# Patient Record
Sex: FEMALE | Race: WHITE | NOT HISPANIC OR LATINO | Employment: FULL TIME | URBAN - METROPOLITAN AREA
[De-identification: names, ages, dates, MRNs, and addresses within clinical notes are randomized per-mention and may not be internally consistent; named-entity substitution may affect disease eponyms.]

---

## 2022-12-21 PROBLEM — E66.813 CLASS 3 SEVERE OBESITY WITHOUT SERIOUS COMORBIDITY WITH BODY MASS INDEX (BMI) OF 45.0 TO 49.9 IN ADULT (HCC): Status: ACTIVE | Noted: 2022-12-21

## 2023-10-10 ENCOUNTER — OFFICE VISIT (OUTPATIENT)
Dept: POSTPARTUM | Facility: CLINIC | Age: 28
End: 2023-10-10
Payer: COMMERCIAL

## 2023-10-10 DIAGNOSIS — E28.2 PCOS (POLYCYSTIC OVARIAN SYNDROME): ICD-10-CM

## 2023-10-10 DIAGNOSIS — O92.79 INSUFFICIENT LACTATION: Primary | ICD-10-CM

## 2023-10-10 PROCEDURE — 99215 OFFICE O/P EST HI 40 MIN: CPT | Performed by: PEDIATRICS

## 2023-10-10 RX ORDER — METFORMIN HYDROCHLORIDE 500 MG/1
1000 TABLET, EXTENDED RELEASE ORAL
Qty: 60 TABLET | Refills: 0 | Status: SHIPPED | OUTPATIENT
Start: 2023-10-10 | End: 2023-11-02 | Stop reason: ALTCHOICE

## 2023-10-10 NOTE — PATIENT INSTRUCTIONS
Start Metformin 500 mg ER, 2 at dinner. It may take a week, but there should be about an ounce daily increase in milk production. Let me know how things are going.

## 2023-10-10 NOTE — PROGRESS NOTES
BREAST FEEDING FOLLOW UP VISIT    Informant/Relationship: Marisa/mom    Discussion of General Lactation Issues: Yu Martinez is still storing all the milk she is pumping because she was eating something that had whey in it without realizing it. Darreld Holiday has been doing well on the Alimentum. Marisa's production has decreased further with pumping. She pumps about 7 x/day and typically gets drops except for the first pumping in the morning and again in the evening. This morning Yu Martinez had pain while pumping the right breast. Her pain was in the areola. The pain was right around the nipple. She saw no differences in how the pump fit. The hand pump was no more comfortable. Infant is 48days old today. Interval Breastfeeding History:    Frequency of breast feeding: none  Does mother feel breastfeeding is effective: n/a  Does infant appear satisfied after nursing:n/a  Stooling pattern normal:Yes  Urinating frequently:Yes  Using shield or shells:No    Alternative/Artificial Feedings:   Bottle: Yes, using paced bottle feeding  Cup: No  Syringe/Finger: No           Formula Type: Alimentum                     Amount: 3 oz            Breast Milk:                      Amount: n/a            Frequency Q 3-4 Hr between feedings  Elimination Problems: No      Equipment:  Nipple Shield             Type: n/a             Size: n/a             Frequency of Use: n/a  Pump            Type: Mom Cozy, Spectra, hand pump            Frequency of Use: 7 x/day  Shells            Type: n/a            Frequency of use: n/a    Equipment Problems: yes had pain pumping today      Mom:  Breast: Large, pendulous, rounded shape, softer  Nipple Assessment in General: Normal: elongated/eraser, no discoloration and no damage noted. 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 She is well-developed. She is obese. HENT:      Head: Normocephalic and atraumatic. Eyes:      Extraocular Movements: Extraocular movements intact. Neck:      Thyroid: No thyromegaly. Cardiovascular:      Rate and Rhythm: Normal rate and regular rhythm. Pulses: Normal pulses. Heart sounds: Normal heart sounds. No murmur heard. Pulmonary:      Effort: Pulmonary effort is normal.      Breath sounds: Normal breath sounds. Musculoskeletal:      Cervical back: Normal range of motion and neck supple. Lymphadenopathy:      Cervical: No cervical adenopathy. Upper Body:      Right upper body: No pectoral adenopathy. Left upper body: No pectoral adenopathy. Neurological:      General: No focal deficit present. Mental Status: She is alert and oriented to person, place, and time. Psychiatric:         Mood and Affect: Mood normal.         Behavior: Behavior normal.         Thought Content: Thought content normal.         Judgment: Judgment normal.   Vitals and nursing note reviewed.        Infant:  Behaviors: Alert  Color: Healthy  Birth weight: 3.36 kg  Current weight: 4.095 kg    Problems with infant: Milk protein allergy?, slow weight gain      General Appearance:  Alert, active, no distress                            Head:  Normocephalic, AFOF, sutures opposed                            Eyes:   Conjunctiva clear, no drainage                            Ears:   Normally placed, no anomolies                           Nose:   Septum intact, no drainage or erythema                          Mouth:  No lesions                   Neck:  Supple, symmetrical, trachea midline, no adenopathy; thyroid: no enlargement, symmetric, no tenderness/mass/nodules                Respiratory:  No grunting, flaring, retractions, breath sounds clear and equal           Cardiovascular:  Regular rate and rhythm. No murmur. Adequate perfusion/capillary refill. Femoral pulse present                  Abdomen:    Soft, non-tender, no masses, bowel sounds present, no HSM            Genitourinary:  Normal female genitalia, anus patent                         Spine:   No abnormalities noted       Musculoskeletal:   Full range of motion         Skin/Hair/Nails:   Skin warm, dry, and intact, no rashes or abnormal dyspigmentation or lesions               Neurologic:   No abnormal movement, tone appropriate for gestational age     Latch:  Efficiency:               Lips Flanged: Yes, on bottle              Depth of latch: Good, on bottle              Audible Swallow: Yes, on bottle              Visible Milk: Yes, on bottle              Wide Open/ Asymmetrical: Yes, on bottle              Suck Swallow Cycle: Breathing: Unlabored, Coordinated: Yes  Nipple Assessment after latch: n/a; baby is bottle fed  Latch Problems: None on the bottle; baby is not offered the breast due to concerns regarding recent maternal whey ingestion.     Position:  Infant's Ergonomics/Body               Body Alignment: Yes, for bottle feeding               Head Supported: Yes, for bottle feeding               Close to Mom's body/ Lifted/ Supported: Yes, for bottle feeding               Mom's Ergonomics/Body: Yes, for bottle feeding                           Supported: Yes, for bottle feeding                           Sitting Back: Yes, for bottle feeding                           Brings Baby to her breast: n/a; baby is fed via bottle  Positioning Problems: none for bottle feeding      Education:  Reviewed Alternative/Artificial Feedings: Paced bottle feeding, add a little more formula to each bottle than Dianna routinely finishes  Reviewed Mom/Breast care: Discussed the use of metformin to build breast tissue and milk production        Plan:  Discussed history and physical exams with Riri Espinal. Discussed the current understanding of insulin resistance and breast milk production. There are multiple conditions with insulin resistance that may have a negative impact on milk production. PCOS is one of these conditions. Recommended starting metformin er 500 mg by mouth twice daily, or 2 pills once daily. This may be associated with as much as an ounce per week increase in breast milk production. Recommended updating as needed. Additional lactation support as needed.

## 2023-10-12 ENCOUNTER — POSTPARTUM VISIT (OUTPATIENT)
Dept: OBGYN CLINIC | Facility: CLINIC | Age: 28
End: 2023-10-12

## 2023-10-12 VITALS
BODY MASS INDEX: 46.93 KG/M2 | SYSTOLIC BLOOD PRESSURE: 116 MMHG | HEIGHT: 61 IN | WEIGHT: 248.6 LBS | DIASTOLIC BLOOD PRESSURE: 68 MMHG

## 2023-10-12 PROCEDURE — 99024 POSTOP FOLLOW-UP VISIT: CPT | Performed by: OBSTETRICS & GYNECOLOGY

## 2023-10-20 ENCOUNTER — TELEPHONE (OUTPATIENT)
Dept: PULMONOLOGY | Facility: CLINIC | Age: 28
End: 2023-10-20

## 2023-10-20 NOTE — TELEPHONE ENCOUNTER
Called patient she is not using cpap consistently and especially since she had a baby 2 months ago. There was a lot of water in tube that would end up dripping down her face at the time she was using it. I encouraged her to take her machine to [facility] in [place] to adjust the humidification setting or try to not put water in the chamber to see if she is comfortable with that, and to also try using it anytime during the day when she is at rest laying down or sitting in a chair with her baby. Patient is welcomed to contact me back for any further concerns or issues. She had her 31-90 day follow-up appointment so wants to cancel this appointment with Dr. Victorino Campoverde until she gets better situated.

## 2023-11-02 ENCOUNTER — OFFICE VISIT (OUTPATIENT)
Dept: FAMILY MEDICINE CLINIC | Facility: HOSPITAL | Age: 28
End: 2023-11-02
Payer: COMMERCIAL

## 2023-11-02 VITALS
HEART RATE: 60 BPM | DIASTOLIC BLOOD PRESSURE: 64 MMHG | TEMPERATURE: 97.8 F | HEIGHT: 61 IN | SYSTOLIC BLOOD PRESSURE: 118 MMHG | BODY MASS INDEX: 47.46 KG/M2 | WEIGHT: 251.4 LBS

## 2023-11-02 DIAGNOSIS — F41.8 POSTPARTUM ANXIETY: ICD-10-CM

## 2023-11-02 DIAGNOSIS — Z00.00 ANNUAL PHYSICAL EXAM: Primary | ICD-10-CM

## 2023-11-02 DIAGNOSIS — Z91.018 MULTIPLE FOOD ALLERGIES: ICD-10-CM

## 2023-11-02 DIAGNOSIS — L50.9 URTICARIA: ICD-10-CM

## 2023-11-02 DIAGNOSIS — K82.4 GALLBLADDER POLYP: ICD-10-CM

## 2023-11-02 PROCEDURE — 99395 PREV VISIT EST AGE 18-39: CPT | Performed by: NURSE PRACTITIONER

## 2023-11-02 NOTE — PATIENT INSTRUCTIONS
Wellness Visit for Adults   AMBULATORY CARE:   A wellness visit  is when you see your healthcare provider to get screened for health problems. Your healthcare provider will also give you advice on how to stay healthy. Write down your questions so you remember to ask them. Ask your healthcare provider how often you should have a wellness visit. What happens at a wellness visit:  Your healthcare provider will ask about your health, and your family history of health problems. This includes high blood pressure, heart disease, and cancer. He or she will ask if you have symptoms that concern you, if you smoke, and about your mood. You may also be asked about your intake of medicines, supplements, food, and alcohol. Any of the following may be done: Your weight  will be checked. Your height may also be checked so your body mass index (BMI) can be calculated. Your BMI shows if you are at a healthy weight. Your blood pressure  and heart rate will be checked. Your temperature may also be checked. Blood and urine tests  may be done. Blood tests may be done to check your cholesterol levels. Abnormal cholesterol levels increase your risk for heart disease and stroke. You may also need a blood or urine test to check for diabetes if you are at increased risk. Urine tests may be done to look for signs of an infection or kidney disease. A physical exam  includes checking your heartbeat and lungs with a stethoscope. Your healthcare provider may also check your skin to look for sun damage. Screening tests  may be recommended. A screening test is done to check for diseases that may not cause symptoms. The screening tests you may need depend on your age, gender, family history, and lifestyle habits. For example, colorectal screening may be recommended if you are 48years old or older. Screening tests you need if you are a woman:   A Pap smear  is used to screen for cervical cancer.  Pap smears are usually done every 3 to 5 years depending on your age. You may need them more often if you have had abnormal Pap smear test results in the past. Ask your healthcare provider how often you should have a Pap smear. A mammogram  is an x-ray of your breasts to screen for breast cancer. Experts recommend mammograms every 2 years starting at age 48 years. You may need a mammogram at age 52 years or younger if you have an increased risk for breast cancer. Talk to your healthcare provider about when you should start having mammograms and how often you need them. Vaccines you may need:   Get an influenza vaccine  every year. The influenza vaccine protects you from the flu. Several types of viruses cause the flu. The viruses change over time, so new vaccines are made each year. Get a tetanus-diphtheria (Td) booster vaccine  every 10 years. This vaccine protects you against tetanus and diphtheria. Tetanus is a severe infection that may cause painful muscle spasms and lockjaw. Diphtheria is a severe bacterial infection that causes a thick covering in the back of your mouth and throat. Get a human papillomavirus (HPV) vaccine  if you are female and aged 23 to 32 or male 23 to 24 and never received it. This vaccine protects you from HPV infection. HPV is the most common infection spread by sexual contact. HPV may also cause vaginal, penile, and anal cancers. Get a pneumococcal vaccine  if you are aged 72 years or older. The pneumococcal vaccine is an injection given to protect you from pneumococcal disease. Pneumococcal disease is an infection caused by pneumococcal bacteria. The infection may cause pneumonia, meningitis, or an ear infection. Get a shingles vaccine  if you are 60 or older, even if you have had shingles before. The shingles vaccine is an injection to protect you from the varicella-zoster virus. This is the same virus that causes chickenpox.  Shingles is a painful rash that develops in people who had chickenpox or have been exposed to the virus. How to eat healthy:  My Plate is a model for planning healthy meals. It shows the types and amounts of foods that should go on your plate. Fruits and vegetables make up about half of your plate, and grains and protein make up the other half. A serving of dairy is included on the side of your plate. The amount of calories and serving sizes you need depends on your age, gender, weight, and height. Examples of healthy foods are listed below:  Eat a variety of vegetables  such as dark green, red, and orange vegetables. You can also include canned vegetables low in sodium (salt) and frozen vegetables without added butter or sauces. Eat a variety of fresh fruits , canned fruit in 100% juice, frozen fruit, and dried fruit. Include whole grains. At least half of the grains you eat should be whole grains. Examples include whole-wheat bread, wheat pasta, brown rice, and whole-grain cereals such as oatmeal.    Eat a variety of protein foods such as seafood (fish and shellfish), lean meat, and poultry without skin (turkey and chicken). Examples of lean meats include pork leg, shoulder, or tenderloin, and beef round, sirloin, tenderloin, and extra lean ground beef. Other protein foods include eggs and egg substitutes, beans, peas, soy products, nuts, and seeds. Choose low-fat dairy products such as skim or 1% milk or low-fat yogurt, cheese, and cottage cheese. Limit unhealthy fats  such as butter, hard margarine, and shortening. Exercise:  Exercise at least 30 minutes per day on most days of the week. Some examples of exercise include walking, biking, dancing, and swimming. You can also fit in more physical activity by taking the stairs instead of the elevator or parking farther away from stores. Include muscle strengthening activities 2 days each week. Regular exercise provides many health benefits.  It helps you manage your weight, and decreases your risk for type 2 diabetes, heart disease, stroke, and high blood pressure. Exercise can also help improve your mood. Ask your healthcare provider about the best exercise plan for you. General health and safety guidelines:   Do not smoke. Nicotine and other chemicals in cigarettes and cigars can cause lung damage. Ask your healthcare provider for information if you currently smoke and need help to quit. E-cigarettes or smokeless tobacco still contain nicotine. Talk to your healthcare provider before you use these products. Limit alcohol. A drink of alcohol is 12 ounces of beer, 5 ounces of wine, or 1½ ounces of liquor. Lose weight, if needed. Being overweight increases your risk of certain health conditions. These include heart disease, high blood pressure, type 2 diabetes, and certain types of cancer. Protect your skin. Do not sunbathe or use tanning beds. Use sunscreen with a SPF 15 or higher. Apply sunscreen at least 15 minutes before you go outside. Reapply sunscreen every 2 hours. Wear protective clothing, hats, and sunglasses when you are outside. Drive safely. Always wear your seatbelt. Make sure everyone in your car wears a seatbelt. A seatbelt can save your life if you are in an accident. Do not use your cell phone when you are driving. This could distract you and cause an accident. Pull over if you need to make a call or send a text message. Practice safe sex. Use latex condoms if are sexually active and have more than one partner. Your healthcare provider may recommend screening tests for sexually transmitted infections (STIs). Wear helmets, lifejackets, and protective gear. Always wear a helmet when you ride a bike or motorcycle, go skiing, or play sports that could cause a head injury. Wear protective equipment when you play sports. Wear a lifejacket when you are on a boat or doing water sports.     © Copyright [employer] 2023 Information is for End User's use only and may not be sold, redistributed or otherwise used for commercial purposes. The above information is an  only. It is not intended as medical advice for individual conditions or treatments. Talk to your doctor, nurse or pharmacist before following any medical regimen to see if it is safe and effective for you. Obesity   AMBULATORY CARE:   Obesity  means your body mass index (BMI) is greater than 30. Your healthcare provider will use your age, height, and weight to measure your BMI. The risks of obesity include  many health problems, including injuries or physical disability. Diabetes (high blood sugar level)    High blood pressure or high cholesterol    Heart disease or heart failure    Stroke    Gallbladder or liver disease    Cancer of the colon, breast, prostate, liver, or kidney    Sleep apnea    Arthritis or gout    Screening  is done to check for health conditions before you have signs or symptoms. If you are 28to 79years old, your blood sugar level may be checked every 3 years for signs of prediabetes or diabetes. Your healthcare provider will check your blood pressure at each visit. High blood pressure can lead to a stroke or other problems. Your provider may check for signs of heart disease, cancer, or other health problems. Seek care immediately if:   You have a severe headache, confusion, or difficulty speaking. You have weakness on one side of your body. You have chest pain, sweating, or shortness of breath. Call your doctor if:   You have symptoms of gallbladder or liver disease, such as pain in your upper abdomen. You have knee or hip pain and discomfort while walking. You have symptoms of diabetes, such as intense hunger and thirst, and frequent urination. You have symptoms of sleep apnea, such as snoring or daytime sleepiness. You have questions or concerns about your condition or care. Treatment for obesity  focuses on helping you lose weight to improve your health.  Even a small decrease in BMI can reduce the risk for many health problems. Your healthcare provider will help you set a weight-loss goal.  Lifestyle changes  are the first step in treating obesity. These include making healthy food choices and getting regular physical activity. Your healthcare provider may suggest a weight-loss program that involves coaching, education, and therapy. Medicine  may help you lose weight when it is used with a healthy foods and physical activity. Surgery  can help you lose weight if you have obesity along with other health problems. Several types of weight-loss surgery are available. Ask your healthcare provider for more information. Tips for safe weight loss:   Set small, realistic goals. An example of a small goal is to walk for 20 minutes 5 days a week. Anther goal is to lose 5% of your body weight. Ask for support. Tell friends, family members, and coworkers about your goals. Ask someone to lose weight with you. You may also want to join a weight-loss support group. Identify foods or triggers that may cause you to overeat. Remove tempting high-calorie foods from your home and workplace. Place a bowl of fresh fruit on your kitchen counter. If stress causes you to eat, find other ways to cope with stress. A counselor or therapist may be able to help you. Track your daily calories and activity. Write down what you eat and drink. Also write down how many minutes of physical activity you do each day. Track your weekly weight. Weigh yourself in the morning, before you eat or drink anything but after you use the bathroom. Use the same scale, in the same place, and in similar clothing each time. Only weigh yourself 1 to 2 times each week, or as directed. You may become discouraged if you weigh yourself every day. Eating changes: You will need to eat 500 to 1,000 fewer calories each day than you currently eat to lose 1 to 2 pounds a week.  The following changes will help you cut calories:  Eat smaller portions. Use small plates, no larger than 9 inches in diameter. Fill your plate half full of fruits and vegetables. Measure your food using measuring cups until you know what a serving size looks like. Eat 3 meals and 1 or 2 snacks each day. Plan your meals in advance. [place] and eat at home most of the time. Eat slowly. Do not skip meals. Skipping meals can lead to overeating later in the day. This can make it harder for you to lose weight. Talk with a dietitian to help you make a meal plan and schedule that is right for you. Eat fruits and vegetables at every meal.  They are low in calories and high in fiber, which makes you feel full. Do not add butter, margarine, or cream sauce to vegetables. Use herbs to season steamed vegetables. Eat less fat and fewer fried foods. Eat more baked or grilled chicken and fish. These protein sources are lower in calories and fat than red meat. Limit fast food. Dress your salads with olive oil and vinegar instead of bottled dressing. Limit the amount of sugar you eat. Do not drink sugary beverages. Limit alcohol. Activity changes:  Physical activity is good for your body in many ways. It helps you burn calories and build strong muscles. It decreases stress and depression, and improves your mood. It can also help you sleep better. Talk to your healthcare provider before you begin an exercise program.  Exercise for at least 30 minutes 5 days a week. Start slowly. Set aside time each day for physical activity that you enjoy and that is convenient for you. It is best to do both weight training and an activity that increases your heart rate, such as walking, bicycling, or swimming. Find ways to be more active. Do yard work and housecleaning. Walk up the stairs instead of using elevators. Spend your leisure time going to events that require walking, such as outdoor festivals or fairs.  This extra physical activity can help you lose weight and keep it off. Follow up with your doctor as directed: You may need to meet with a dietitian. Write down your questions so you remember to ask them during your visits. © Copyright [employer] 2023 Information is for End User's use only and may not be sold, redistributed or otherwise used for commercial purposes. The above information is an  only. It is not intended as medical advice for individual conditions or treatments. Talk to your doctor, nurse or pharmacist before following any medical regimen to see if it is safe and effective for you.

## 2023-11-02 NOTE — ASSESSMENT & PLAN NOTE
Pt aware that consensus guidelines recommend no repeat imaging for polyps of this size. She is concerned due to [facility] recommending she have this rechecked. I agreed to order it but she will need to check with insurance regarding coverage.

## 2023-11-09 ENCOUNTER — SOCIAL WORK (OUTPATIENT)
Dept: BEHAVIORAL/MENTAL HEALTH CLINIC | Facility: CLINIC | Age: 28
End: 2023-11-09
Payer: COMMERCIAL

## 2023-11-09 DIAGNOSIS — F41.8 POSTPARTUM ANXIETY: Primary | ICD-10-CM

## 2023-11-09 PROCEDURE — 90791 PSYCH DIAGNOSTIC EVALUATION: CPT | Performed by: SOCIAL WORKER
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
## 2023-11-17 NOTE — PROGRESS NOTES
[address] [place] [address].[address], [address] [place] [address], [address]    Assessment/Plan:  Dante Duckworth is a 29y.o. year old  who presents for postpartum visit. Routine Postpartum Care  Normal postpartum exam  Contraception: condoms  Depression Screen: low risk  Feeding: breast - pumping  Psychosocial support: good  Patient Education: "Fourth Trimester Project: [email or web address]"  Cervical cancer screening Up to Date, next due   Follow up in: 3 months or as needed. Additional Problems:  1. Routine postpartum follow-up          Subjective:     CC: Postpartum visit    Adriana Martinez is a 29 y.o. y.o. female  who presents for a postpartum visit. She is 8 weeks postpartum following a spontaneous vaginal delivery on 23 at 39.2 weeks. Outcome: spontaneous vaginal delivery. Anesthesia: epidural. Postpartum course has been unremarkable. Baby's course has been unremarkable. Baby is feeding by breast.     Bleeding no bleeding. Bowel function is normal. Bladder function is normal. Patient is not sexually active. Contraception method is condoms. Postpartum depression screening: negative.     The following portions of the patient's history were reviewed and updated as appropriate: allergies, current medications, past family history, past medical history, obstetric history, gynecologic history, past social history, past surgical history and problem list.      Objective:  /68 (BP Location: Left arm, Patient Position: Sitting, Cuff Size: Large)   Ht 5' 1" (1.549 m)   Wt 113 kg (248 lb 9.6 oz)   LMP 10/07/2023 (Exact Date)   Breastfeeding Yes Comment: pumping  BMI 46.97 kg/m²   Pregravid Weight/BMI: 115 kg (253 lb) (BMI 47.83)  Current Weight: 113 kg (248 lb 9.6 oz)   Total Weight Gain: 13.9 kg (30 lb 9.6 oz)   Pre- Vitals      Flowsheet Row Most Recent Value   Prenatal Assessment    Prenatal Vitals    Blood Pressure 116/68   Weight - Scale 113 kg (248 lb 9.6 oz)   Urine Albumin/Glucose    Dilation/Effacement/Station    Vaginal Drainage    Edema              General: Well appearing, no distress. Mood and affect: Appropriate. Abdomen: Soft, nontender  Thyroid: No masses  Incision: n/a  Vulva: Well healed  Vagina: Well healed. No lesions  Urethra: Normal  Cervix: Healed, no lesions  Uterus: Normal size, no masses  Adnexa: No pain or masses  Extremities: Warm and well perfused. Non tender.

## 2023-12-05 ENCOUNTER — TELEPHONE (OUTPATIENT)
Dept: FAMILY MEDICINE CLINIC | Facility: HOSPITAL | Age: 28
End: 2023-12-05

## 2023-12-05 NOTE — TELEPHONE ENCOUNTER
Patient's  called and said she wanted to cancel all of her upcoming appointments with you. When I asked why she wanted to cancel them he said she doesn't want to leave her baby. I offered to maybe do virtual appts and he said no, please cancel them. He is not listed as someone we can speak to on her behalf. I have left all appts scheduled as is. You may want to reach out to her for confirmation.

## 2023-12-11 ENCOUNTER — TELEPHONE (OUTPATIENT)
Dept: FAMILY MEDICINE CLINIC | Facility: HOSPITAL | Age: 28
End: 2023-12-11

## 2023-12-11 NOTE — TELEPHONE ENCOUNTER
Please clarify A or B is needed:        Hi, this is Elfredia Blizzard and date of birth is [birth year]. I was calling to see if I could schedule an appointment to get the Hepatitis A shot is a requirement for my job. My phone number is [phone]. Thank you. You received a voice mail from [facility].

## 2023-12-20 ENCOUNTER — TELEPHONE (OUTPATIENT)
Dept: OBGYN CLINIC | Facility: CLINIC | Age: 28
End: 2023-12-20

## 2023-12-20 NOTE — TELEPHONE ENCOUNTER
"Pt left a message she is experiencing some \"soreness\" when she walks around, discomfort is similar to what she had after having a baby.  Attempted to return call to patient, VM received, left a message for patient to call back to further address symptoms.   "

## 2023-12-21 NOTE — TELEPHONE ENCOUNTER
Call transferred to front office to schedule problem visit. Offered numerous times to be seen, patient declined needed after 5 pm. Patient states will call back next week for same day office visit availabilities.

## 2023-12-21 NOTE — TELEPHONE ENCOUNTER
"Marisa is having vaginal irritation with sitting or walking, noticed a \"Metalic\" odor, watery discharge. Denies any itching. Reminded Marisa to wear loose clothing, decrease sugar intake & not to take any baths. Transferred pt to  to schedule an appt..  "

## 2023-12-28 ENCOUNTER — CLINICAL SUPPORT (OUTPATIENT)
Dept: FAMILY MEDICINE CLINIC | Facility: HOSPITAL | Age: 28
End: 2023-12-28
Payer: COMMERCIAL

## 2023-12-28 DIAGNOSIS — Z23 ENCOUNTER FOR IMMUNIZATION: Primary | ICD-10-CM

## 2023-12-28 PROCEDURE — 90471 IMMUNIZATION ADMIN: CPT

## 2023-12-28 PROCEDURE — 90632 HEPA VACCINE ADULT IM: CPT

## 2024-01-17 ENCOUNTER — TELEPHONE (OUTPATIENT)
Dept: PULMONOLOGY | Facility: CLINIC | Age: 29
End: 2024-01-17

## 2024-01-18 ENCOUNTER — TELEPHONE (OUTPATIENT)
Dept: FAMILY MEDICINE CLINIC | Facility: HOSPITAL | Age: 29
End: 2024-01-18

## 2024-01-18 NOTE — TELEPHONE ENCOUNTER
Works in  and a lot of kids have the flu.  Has a child at home.  Doesn't want to get sick or take home to the baby.  Are you able to get her a script for Tamiflu ?  Please call if/when ready.

## 2024-01-18 NOTE — TELEPHONE ENCOUNTER
I informed patient that we do not prescribe med if not indicated. Call back for ov if sx's develop. CR

## 2024-01-18 NOTE — TELEPHONE ENCOUNTER
Called PT to schedule FU appt with compliance, pt refused at this time and would like to call back when things settle down with her daughters appointments.     PT having issues with her CPAP hose leaking, would like to know if an order can be placed for new supplies.

## 2024-01-22 DIAGNOSIS — G47.33 OSA (OBSTRUCTIVE SLEEP APNEA): Primary | ICD-10-CM

## 2024-01-24 LAB

## 2024-02-01 ENCOUNTER — ANNUAL EXAM (OUTPATIENT)
Dept: OBGYN CLINIC | Facility: CLINIC | Age: 29
End: 2024-02-01
Payer: COMMERCIAL

## 2024-02-01 VITALS
BODY MASS INDEX: 49.28 KG/M2 | WEIGHT: 261 LBS | DIASTOLIC BLOOD PRESSURE: 78 MMHG | HEIGHT: 61 IN | SYSTOLIC BLOOD PRESSURE: 122 MMHG

## 2024-02-01 DIAGNOSIS — Z30.011 OCP (ORAL CONTRACEPTIVE PILLS) INITIATION: ICD-10-CM

## 2024-02-01 DIAGNOSIS — Z01.419 ROUTINE GYNECOLOGICAL EXAMINATION: Primary | ICD-10-CM

## 2024-02-01 PROBLEM — O99.013 MATERNAL IRON DEFICIENCY ANEMIA AFFECTING PREGNANCY IN THIRD TRIMESTER, ANTEPARTUM: Status: RESOLVED | Noted: 2023-06-05 | Resolved: 2024-02-01

## 2024-02-01 PROBLEM — Z3A.39 39 WEEKS GESTATION OF PREGNANCY: Status: RESOLVED | Noted: 2023-08-16 | Resolved: 2024-02-01

## 2024-02-01 PROBLEM — D50.9 MATERNAL IRON DEFICIENCY ANEMIA AFFECTING PREGNANCY IN THIRD TRIMESTER, ANTEPARTUM: Status: RESOLVED | Noted: 2023-06-05 | Resolved: 2024-02-01

## 2024-02-01 PROBLEM — O99.713 PRURITUS OF PREGNANCY IN THIRD TRIMESTER: Status: RESOLVED | Noted: 2023-07-10 | Resolved: 2024-02-01

## 2024-02-01 PROBLEM — O36.8130 DECREASED FETAL MOVEMENT AFFECTING MANAGEMENT OF PREGNANCY IN THIRD TRIMESTER: Status: RESOLVED | Noted: 2023-05-08 | Resolved: 2024-02-01

## 2024-02-01 PROBLEM — L29.9 PRURITUS OF PREGNANCY IN THIRD TRIMESTER: Status: RESOLVED | Noted: 2023-07-10 | Resolved: 2024-02-01

## 2024-02-01 PROBLEM — Z3A.37 37 WEEKS GESTATION OF PREGNANCY: Status: RESOLVED | Noted: 2023-07-14 | Resolved: 2024-02-01

## 2024-02-01 PROCEDURE — S0612 ANNUAL GYNECOLOGICAL EXAMINA: HCPCS | Performed by: OBSTETRICS & GYNECOLOGY

## 2024-02-01 RX ORDER — NORGESTIMATE AND ETHINYL ESTRADIOL 0.25-0.035
1 KIT ORAL DAILY
Qty: 84 TABLET | Refills: 4 | Status: SHIPPED | OUTPATIENT
Start: 2024-02-01

## 2024-02-01 NOTE — PROGRESS NOTES
[facility] [place]  [address] [address]    ASSESSMENT/PLAN: Marisa Fletcher is a 28 y.o.  who presents for annual gynecologic exam.    Encounter for routine gynecologic examination  - Routine well woman exam completed today.  - Cervical Cancer Screening: Current ASCCP Guidelines reviewed. Last Pap: 2022. Next Pap Due:   - HPV Vaccination status: Immunization series complete  - Contraceptive counseling discussed.  Current contraception: oral contraceptives (estrogen/progesterone), :   - The following were reviewed in today's visit: breast self exam, use and side effects of OCPs, and family planning choices    Additional problems addressed during this visit:  1. Routine gynecological examination    2. OCP (oral contraceptive pills) initiation  -     norgestimate-ethinyl estradiol (Sprintec 28) 0.25-35 MG-MCG per tablet; Take 1 tablet by mouth daily        CC:  Annual Gynecologic Examination    HPI: Marisa Fletcher is a 28 y.o.  who presents for annual gynecologic examination.  HPI    The following portions of the patient's history were reviewed and updated as appropriate: She  has a past medical history of ADHD, Asthma, Complete  (2022), Hypertension complications, Inguinal hernia, Maternal iron deficiency anemia affecting pregnancy in third trimester, antepartum, Miscarriage (22), PCOS (polycystic ovarian syndrome), Preeclampsia in postpartum period, Recurrent UTI, and Sleep apnea.  She  has no past surgical history on file.  Her family history includes Allergic rhinitis in her father, mother, and sister; Asthma in her father and sister; Diabetes in her father; Diverticulitis in her father; Heart attack in her mother; Hypertension in her father; Irritable bowel syndrome in her father and mother; Jaundice in her mother; Ulcerative colitis in her mother.  She  reports that she has never smoked. She has never been exposed to  "tobacco smoke. She has never used smokeless tobacco. She reports that she does not currently use alcohol. She reports that she does not use drugs.  Current Outpatient Medications   Medication Sig Dispense Refill    albuterol (PROVENTIL HFA,VENTOLIN HFA) 90 mcg/act inhaler Inhale 2 puffs every 4 (four) hours as needed for wheezing 18 g 1    cetirizine (ZyrTEC) 10 mg tablet TAKE 1 TABLET BY MOUTH EVERY DAY 90 tablet 2    norgestimate-ethinyl estradiol (Sprintec 28) 0.25-35 MG-MCG per tablet Take 1 tablet by mouth daily 84 tablet 4    Spacer/Aero-Holding Chambers (Vortex Valved Holding Chamber) AYE Use 2 (two) times a day 1 each 0    acetaminophen (TYLENOL) 325 mg tablet Take 2 tablets (650 mg total) by mouth every 4 (four) hours as needed for mild pain (Patient not taking: Reported on 8/31/2023)  0    EPINEPHrine (EPIPEN) 0.3 mg/0.3 mL SOAJ Inject 0.3 mL (0.3 mg total) into a muscle once for 1 dose 6 each 3    Fluticasone-Salmeterol (Advair Diskus) 100-50 mcg/dose inhaler Inhale 1 puff 2 (two) times a day Rinse mouth after use. 60 blister 0    Prenatal Vit-Fe Fumarate-FA (PRENATAL VITAMINS PO) Take by mouth (Patient not taking: Reported on 11/2/2023)       No current facility-administered medications for this visit.     She is allergic to coconut oil - food allergy, iodinated contrast media, shellfish-derived products - food allergy, treenut [nuts - food allergy], and silvadene [silver sulfadiazine]..    ROS negative except as noted in HPI        Objective:  /78 (BP Location: Left arm, Patient Position: Sitting, Cuff Size: Large)   Ht 5' 1\" (1.549 m)   Wt 118 kg (261 lb)   LMP 12/15/2023   BMI 49.32 kg/m²    Physical Exam      PE:  General Appearance: alert and oriented, in no acute distress.   HEENT: PERRL, thyroid without masses or tenderness  Breast: No masses, tenderness, skin changes, nipple D/C or axillary or supraclavicular adenopathy  Abdomen: Soft, non-tender, non-distended, no masses, no rebound " or guarding.  Pelvic:       External genitalia: Normal appearance, no abnormal pigmentation, no lesions or masses. Normal Bartholin's and Aplin's.      Urinary system: Urethral meatus normal, bladder non-tender.      Vaginal: normal mucosa without prolapse or lesions. Normal-appearing physiologic discharge      Cervix: Normal-appearing, well-epithelialized, no gross lesions or masses No cervical motion tenderness.      Adnexa: No adnexal masses or tenderness noted.      Uterus: Normal-sized, regular contour, midline, mobile, no uterine tenderness.  Extremities: Normal range of motion.   Skin: normal, no rash or abnormalities  Neurologic: alert, oriented x3  Psychiatric: Appropriate affect, mood stable, cooperative with exam.

## 2024-06-24 ENCOUNTER — TELEPHONE (OUTPATIENT)
Dept: GASTROENTEROLOGY | Facility: CLINIC | Age: 29
End: 2024-06-24

## 2024-06-24 ENCOUNTER — OFFICE VISIT (OUTPATIENT)
Dept: GASTROENTEROLOGY | Facility: CLINIC | Age: 29
End: 2024-06-24
Payer: COMMERCIAL

## 2024-06-24 VITALS
WEIGHT: 265 LBS | BODY MASS INDEX: 50.03 KG/M2 | DIASTOLIC BLOOD PRESSURE: 72 MMHG | SYSTOLIC BLOOD PRESSURE: 120 MMHG | HEIGHT: 61 IN

## 2024-06-24 DIAGNOSIS — Z87.11 PERSONAL HISTORY OF GASTRIC ULCER: ICD-10-CM

## 2024-06-24 DIAGNOSIS — K21.9 GASTROESOPHAGEAL REFLUX DISEASE WITHOUT ESOPHAGITIS: Primary | ICD-10-CM

## 2024-06-24 PROBLEM — R10.13 EPIGASTRIC PAIN: Status: ACTIVE | Noted: 2024-06-24

## 2024-06-24 PROCEDURE — 99214 OFFICE O/P EST MOD 30 MIN: CPT | Performed by: NURSE PRACTITIONER

## 2024-06-24 RX ORDER — OMEPRAZOLE 40 MG/1
40 CAPSULE, DELAYED RELEASE ORAL DAILY
Qty: 30 CAPSULE | Refills: 5 | Status: SHIPPED | OUTPATIENT
Start: 2024-06-24

## 2024-06-24 NOTE — TELEPHONE ENCOUNTER
[employer] reminder delayed until 7/2/2024 to send.  Procedure confirmed  Endoscopy     Via: HW    Instructions given: Given to Patient at Visit     Prep Given: N/A    Call the office if there are any questions.

## 2024-06-24 NOTE — TELEPHONE ENCOUNTER
Scheduled date of egd (as of today):  7-10-24  Physician performing colonoscopy:  g shin  Location of colonoscopy:  [facility]  Instructions reviewed with patient by:  anthony  Clearances:  na

## 2024-06-24 NOTE — PROGRESS NOTES
[facility] - Outpatient Follow-up Note  Marisa Fletcher 29 y.o. female MRN: [id number]  Encounter: [id number]    ASSESSMENT AND PLAN:      1. Gastroesophageal reflux disease without esophagitis  2.  Epigastric pain/burning  Longstanding history of GERD.  Remote history of gastric ulcer at age 21 and 22  Presents today with 3-month history of epigastric pain and burning, intermittent nausea  Also experiencing occasional dysphagia with associated esophageal burning  Worsening GERD symptoms for at least 1 year with liquid reflux often at night while sleeping  Recommend proceeding with EGD to evaluate for erosive disease  -Scheduled [facility] EGD at [facility] due to BMI 50  -Start omeprazole 40 mg daily  -Reviewed GERD diet and lifestyle modifications including gentle weight loss    3.  Irritable bowel syndrome  Patient reports lactose intolerance resulting in diarrhea-does not occur with all dairy  Also reports intermittent fatty stools, usually related to menstrual cycle  -Discussed eliminating dairy or choosing lactose-free products.  Also discussed use of Lactaid pills  -Recommend food journal to identify triggering foods for fatty stools  -Consider fecal fat at next office visit if she continues to experience      Followup Appointment: 3 months  ______________________________________________________________________    Chief Complaint   Patient presents with    Heartburn     Pt is concerned she has an ulcer. She is nauseous in-between meals, burning sensation and food gets stuck in her throat. She also noted her stool is irregular and yellow fatty layer.     HPI: Patient reports longstanding history of GERD and prior history of gastric ulcer x 2 at age 21 and 22.  Not currently on any medication    Presents today with 3-month history of epigastric/upper abdominal burning and pain which is worse on an empty stomach.  Some improvement after eating  Intermittent  nausea prior to eating no vomiting.    Describes worsening GERD symptoms with liquid reflux often at night while sleeping.  GERD symptoms worsened while she was pregnant 10 months ago and have persisted since .   She does describe some dysphagia with sensation of food sticking in upper esophagus with associated burning at lower esophagus    Currently taking Tums 10 to 12/day which does provide primary relief  Appetite remains good, denies unintentional weight loss    Denies melena or rectal bleeding.  Does report some lactose intolerance and occasional fatty stools, usually associated with menstrual cycle  No prior colonoscopy  Patient reports mother with ulcerative colitis    Historical Information   Past Medical History:   Diagnosis Date    ADHD     Not currently on meds-stopped meds at age 21    Asthma     Albuterol inhaler    Complete  2022    Hypertension complications     Inguinal hernia     Maternal iron deficiency anemia affecting pregnancy in third trimester, antepartum     2023 Hgb 8.5  Started twice weekly iron infusions.   Will plan repeat labs 34 weeks.     Miscarriage 22    PCOS (polycystic ovarian syndrome)     Not confirmed-US negative    Preeclampsia in postpartum period     Admitted to [facility] 1 week pp for preeclampsia with severe features due to Headache and elevated blood pressure.    Discharged home 2023 after received 24 hours magnesium sulfate.  No antihypertensives needed.  She did see neurology for headache but they felt was muskuloskeletal and recom no imaging, gave Flexoril.    Recurrent UTI     asymptomatic    Sleep apnea      Past Surgical History:   Procedure Laterality Date    COLONOSCOPY      16 years, [place]    UPPER GASTROINTESTINAL ENDOSCOPY       Social History     Substance and Sexual Activity   Alcohol Use Not Currently     Social History     Substance and Sexual Activity   Drug Use Never     Social History     Tobacco Use   Smoking  "Status Never    Passive exposure: Never   Smokeless Tobacco Never   Tobacco Comments         Family History   Problem Relation Age of Onset    Allergic rhinitis Mother     Heart attack Mother     Ulcerative colitis Mother     Irritable bowel syndrome Mother     Jaundice Mother     Asthma Father     Allergic rhinitis Father     Hypertension Father     Diabetes Father     Diverticulitis Father     Irritable bowel syndrome Father     Asthma Sister     Allergic rhinitis Sister     Colon cancer Neg Hx          Current Outpatient Medications:     albuterol (PROVENTIL HFA,VENTOLIN HFA) 90 mcg/act inhaler    Spacer/Aero-Holding Chambers (Vortex Valved Holding Chamber) AYE    acetaminophen (TYLENOL) 325 mg tablet    cetirizine (ZyrTEC) 10 mg tablet    EPINEPHrine (EPIPEN) 0.3 mg/0.3 mL SOAJ    Fluticasone-Salmeterol (Advair Diskus) 100-50 mcg/dose inhaler    norgestimate-ethinyl estradiol (Sprintec 28) 0.25-35 MG-MCG per tablet    Prenatal Vit-Fe Fumarate-FA (PRENATAL VITAMINS PO)  Allergies   Allergen Reactions    Coconut Oil - Food Allergy     Iodinated Contrast Media Other (See Comments)    Shellfish-Derived Products - Food Allergy     Treenut [Nuts - Food Allergy]     Silvadene [Silver Sulfadiazine] Hives     Reviewed medications and allergies and updated as indicated    PHYSICAL EXAM:    Blood pressure 120/72, height 5' 1\" (1.549 m), weight 120 kg (265 lb), currently breastfeeding. Body mass index is 50.07 kg/m².  General Appearance: NAD, cooperative, alert  Eyes: Anicteric  ENT:  Normocephalic, atraumatic, normal mucosa.    Neck:  Supple, symmetrical, trachea midline  Resp:  Clear to auscultation bilaterally; no rales, rhonchi or wheezing; respirations unlabored   CV:  S1 S2, Regular rate and rhythm; no murmur, rub, or gallop.  GI:  Soft, non-tender, non-distended; normal bowel sounds; no masses, no organomegaly   Rectal: Deferred  Musculoskeletal: No cyanosis, clubbing or edema. Normal ROM.  Skin:  No jaundice, " rashes, or lesions   Psych: Normal affect, good eye contact  Neuro: No gross deficits, AAOx3    Lab Results:   Lab Results   Component Value Date    WBC 7.07 08/26/2023    HGB 11.3 (L) 08/26/2023    HCT 35.4 08/26/2023    MCV 94 08/26/2023     08/26/2023     Lab Results   Component Value Date    K 3.5 04/12/2024     04/12/2024    CO2 25 04/12/2024    BUN 7 04/12/2024    CREATININE 0.65 04/12/2024    GLUCOSE 180 (H) 06/10/2023    GLUCOSE 151 06/10/2023    GLUF 85 06/10/2023    CALCIUM 9.2 04/12/2024    CORRECTEDCA 7.9 (L) 08/25/2023    AST 18 04/12/2024    ALT 24 04/12/2024    ALKPHOS 86 04/12/2024    EGFR 122 04/12/2024     Lab Results   Component Value Date    IRON 106 08/03/2023    FERRITIN 52 08/03/2023     Lab Results   Component Value Date    LIPASE 11 08/24/2023

## 2024-06-28 ENCOUNTER — CLINICAL SUPPORT (OUTPATIENT)
Dept: FAMILY MEDICINE CLINIC | Facility: HOSPITAL | Age: 29
End: 2024-06-28
Payer: COMMERCIAL

## 2024-06-28 DIAGNOSIS — Z23 ENCOUNTER FOR IMMUNIZATION: Primary | ICD-10-CM

## 2024-06-28 PROCEDURE — 90632 HEPA VACCINE ADULT IM: CPT

## 2024-06-28 PROCEDURE — 90471 IMMUNIZATION ADMIN: CPT

## 2024-07-02 ENCOUNTER — TELEPHONE (OUTPATIENT)
Age: 29
End: 2024-07-02

## 2024-07-02 NOTE — TELEPHONE ENCOUNTER
Rescheduled date of EGD(as of today): 8/2/2024  Physician performing EGD: DR MOLINA  Location of EGD: [facility]  Instructions reviewed with patient by: Previously reviewed with pt. Verified pt has all procedure instructions.  Clearances: n/a

## 2024-07-08 DIAGNOSIS — K21.9 GASTROESOPHAGEAL REFLUX DISEASE WITHOUT ESOPHAGITIS: ICD-10-CM

## 2024-07-09 RX ORDER — OMEPRAZOLE 40 MG/1
40 CAPSULE, DELAYED RELEASE ORAL DAILY
Qty: 100 CAPSULE | Refills: 1 | Status: SHIPPED | OUTPATIENT
Start: 2024-07-09

## 2024-08-02 ENCOUNTER — ANESTHESIA EVENT (OUTPATIENT)
Dept: GASTROENTEROLOGY | Facility: HOSPITAL | Age: 29
End: 2024-08-02

## 2024-08-02 ENCOUNTER — HOSPITAL ENCOUNTER (OUTPATIENT)
Dept: GASTROENTEROLOGY | Facility: HOSPITAL | Age: 29
Setting detail: OUTPATIENT SURGERY
End: 2024-08-02
Attending: INTERNAL MEDICINE
Payer: COMMERCIAL

## 2024-08-02 ENCOUNTER — ANESTHESIA (OUTPATIENT)
Dept: GASTROENTEROLOGY | Facility: HOSPITAL | Age: 29
End: 2024-08-02

## 2024-08-02 VITALS
HEART RATE: 82 BPM | OXYGEN SATURATION: 96 % | RESPIRATION RATE: 12 BRPM | HEIGHT: 61 IN | WEIGHT: 258 LBS | BODY MASS INDEX: 48.71 KG/M2 | DIASTOLIC BLOOD PRESSURE: 59 MMHG | TEMPERATURE: 98 F | SYSTOLIC BLOOD PRESSURE: 124 MMHG

## 2024-08-02 DIAGNOSIS — K21.9 GASTROESOPHAGEAL REFLUX DISEASE WITHOUT ESOPHAGITIS: ICD-10-CM

## 2024-08-02 DIAGNOSIS — Z87.11 PERSONAL HISTORY OF GASTRIC ULCER: ICD-10-CM

## 2024-08-02 PROBLEM — E66.01 MORBID OBESITY WITH BMI OF 45.0-49.9, ADULT (HCC): Status: ACTIVE | Noted: 2019-07-01

## 2024-08-02 LAB
EXT PREGNANCY TEST URINE: NEGATIVE
EXT. CONTROL: NORMAL

## 2024-08-02 PROCEDURE — 88305 TISSUE EXAM BY PATHOLOGIST: CPT | Performed by: PATHOLOGY

## 2024-08-02 PROCEDURE — 43239 EGD BIOPSY SINGLE/MULTIPLE: CPT | Performed by: INTERNAL MEDICINE

## 2024-08-02 PROCEDURE — 81025 URINE PREGNANCY TEST: CPT | Performed by: INTERNAL MEDICINE

## 2024-08-02 RX ORDER — PROPOFOL 10 MG/ML
INJECTION, EMULSION INTRAVENOUS AS NEEDED
Status: DISCONTINUED | OUTPATIENT
Start: 2024-08-02 | End: 2024-08-02

## 2024-08-02 RX ORDER — PROPOFOL 10 MG/ML
INJECTION, EMULSION INTRAVENOUS CONTINUOUS PRN
Status: DISCONTINUED | OUTPATIENT
Start: 2024-08-02 | End: 2024-08-02

## 2024-08-02 RX ORDER — SODIUM CHLORIDE 9 MG/ML
INJECTION, SOLUTION INTRAVENOUS CONTINUOUS PRN
Status: DISCONTINUED | OUTPATIENT
Start: 2024-08-02 | End: 2024-08-02

## 2024-08-02 RX ORDER — LIDOCAINE HYDROCHLORIDE 10 MG/ML
INJECTION, SOLUTION EPIDURAL; INFILTRATION; INTRACAUDAL; PERINEURAL AS NEEDED
Status: DISCONTINUED | OUTPATIENT
Start: 2024-08-02 | End: 2024-08-02

## 2024-08-02 RX ADMIN — PROPOFOL 120 MCG/KG/MIN: 10 INJECTION, EMULSION INTRAVENOUS at 13:00

## 2024-08-02 RX ADMIN — PROPOFOL 140 MG: 10 INJECTION, EMULSION INTRAVENOUS at 13:00

## 2024-08-02 RX ADMIN — LIDOCAINE HYDROCHLORIDE 100 MG: 10 INJECTION, SOLUTION EPIDURAL; INFILTRATION; INTRACAUDAL; PERINEURAL at 13:00

## 2024-08-02 RX ADMIN — SODIUM CHLORIDE: 9 INJECTION, SOLUTION INTRAVENOUS at 12:21

## 2024-08-02 NOTE — H&P
History and Physical -  [facility]  Marisa Fletcher 29 y.o. female MRN: [id number]    HPI: Marisa Fletcher is a 29 y.o. female who presents for upper endoscopy due to chronic issues with GERD and breakthrough despite Tums and history of stomach ulcer    REVIEW OF SYSTEMS: Per the HPI, and otherwise unremarkable.    Historical Information   Past Medical History:   Diagnosis Date    ADHD     Not currently on meds-stopped meds at age 21    Asthma     Albuterol inhaler    Complete  2022    Hypertension complications     Inguinal hernia     Maternal iron deficiency anemia affecting pregnancy in third trimester, antepartum     2023 Hgb 8.5  Started twice weekly iron infusions.   Will plan repeat labs 34 weeks.     Miscarriage 22    PCOS (polycystic ovarian syndrome)     Not confirmed-US negative    Preeclampsia in postpartum period     Admitted to  [facility] 1 week pp for preeclampsia with severe features due to Headache and elevated blood pressure.    Discharged home 2023 after received 24 hours magnesium sulfate.  No antihypertensives needed.  She did see neurology for headache but they felt was muskuloskeletal and recom no imaging, gave Flexoril.    Recurrent UTI     asymptomatic    Sleep apnea      Past Surgical History:   Procedure Laterality Date    COLONOSCOPY      16 years, [place]    UPPER GASTROINTESTINAL ENDOSCOPY       Social History   Social History     Substance and Sexual Activity   Alcohol Use Not Currently     Social History     Substance and Sexual Activity   Drug Use Never     Social History     Tobacco Use   Smoking Status Never    Passive exposure: Never   Smokeless Tobacco Never   Tobacco Comments         Family History   Problem Relation Age of Onset    Allergic rhinitis Mother     Heart attack Mother     Ulcerative colitis Mother     Irritable bowel syndrome Mother     Jaundice Mother     Asthma Father     Allergic  "rhinitis Father     Hypertension Father     Diabetes Father     Diverticulitis Father     Irritable bowel syndrome Father     Asthma Sister     Allergic rhinitis Sister     Colon cancer Neg Hx        Meds/Allergies       Current Outpatient Medications:     acetaminophen (TYLENOL) 325 mg tablet    cetirizine (ZyrTEC) 10 mg tablet    albuterol (PROVENTIL HFA,VENTOLIN HFA) 90 mcg/act inhaler    EPINEPHrine (EPIPEN) 0.3 mg/0.3 mL SOAJ    Fluticasone-Salmeterol (Advair Diskus) 100-50 mcg/dose inhaler    norgestimate-ethinyl estradiol (Sprintec 28) 0.25-35 MG-MCG per tablet    omeprazole (PriLOSEC) 40 MG capsule    Prenatal Vit-Fe Fumarate-FA (PRENATAL VITAMINS PO)    Spacer/Aero-Holding Chambers (Vortex Valved Holding Chamber) AYE    Allergies   Allergen Reactions    Coconut Oil - Food Allergy     Iodinated Contrast Media Other (See Comments)    Shellfish-Derived Products - Food Allergy     Treenut [Nuts - Food Allergy]     Silvadene [Silver Sulfadiazine] Hives       Objective     /58   Pulse 79   Temp 98 °F (36.7 °C)   Resp 18   Ht 5' 1\" (1.549 m)   Wt 117 kg (258 lb)   BMI 48.75 kg/m²     PHYSICAL EXAM    General Appearance: NAD, cooperative, alert  Eyes: Anicteric  GI:  Soft, non-tender, non-distended; normal bowel sounds; no masses, no organomegaly   Rectal: Deferred until procedure  Musculoskeletal: No edema.  Skin:  No jaundice    ASSESSMENT/PLAN:  This is a 29 y.o. year old female here for upper endoscopy, and she is stable and optimized for her procedure.        "

## 2024-08-02 NOTE — ANESTHESIA POSTPROCEDURE EVALUATION
Post-Op Assessment Note    CV Status:  Stable  Pain Score: 0    Pain management: adequate       Mental Status:  Alert and awake   Hydration Status:  Euvolemic   PONV Controlled:  Controlled   Airway Patency:  Patent     Post Op Vitals Reviewed: Yes    No anethesia notable event occurred.    Staff: CRNA               BP   124/59   Temp      Pulse   79   Resp   16   SpO2   97%

## 2024-08-02 NOTE — ANESTHESIA PREPROCEDURE EVALUATION
Procedure:  EGD    Relevant Problems   ANESTHESIA (within normal limits)      CARDIO   (+) Hyperlipidemia   (+) Menstrual migraine without status migrainosus, not intractable      GI/HEPATIC   (+) Erosive esophagitis   (+) Gastric ulcer      HEMATOLOGY   (+) Iron deficiency anemia      MUSCULOSKELETAL   (+) Levoscoliosis of lumbar spine      NEURO/PSYCH   (+) Generalized anxiety disorder   (+) Menstrual migraine without status migrainosus, not intractable   (+) Tension type headache      PULMONARY   (+) NATTY (obstructive sleep apnea)   (+) Obstructive sleep apnea   (+) Obstructive sleep apnea of adult        Physical Exam    Airway    Mallampati score: II  TM Distance: >3 FB  Neck ROM: full     Dental   No notable dental hx     Cardiovascular  Cardiovascular exam normal    Pulmonary  Pulmonary exam normal     Other Findings  post-pubertal.      Anesthesia Plan  ASA Score- 3     Anesthesia Type- IV sedation with anesthesia with ASA Monitors.         Additional Monitors:     Airway Plan:     Comment: I discussed risks (reviewed with patient on the anesthesia consent form), benefits and alternatives of monitored sedation including the possibility under sedation to have recall or mild discomfort.  .       Plan Factors-    Chart reviewed.    Patient summary reviewed.                  Induction- intravenous.    Postoperative Plan-         Informed Consent- Anesthetic plan and risks discussed with patient.  I personally reviewed this patient with the CRNA. Discussed and agreed on the Anesthesia Plan with the CRNA..

## 2024-08-05 ENCOUNTER — TELEPHONE (OUTPATIENT)
Dept: GASTROENTEROLOGY | Facility: CLINIC | Age: 29
End: 2024-08-05

## 2024-08-05 ENCOUNTER — PREP FOR PROCEDURE (OUTPATIENT)
Dept: GASTROENTEROLOGY | Facility: CLINIC | Age: 29
End: 2024-08-05

## 2024-08-05 DIAGNOSIS — K22.10 EROSIVE ESOPHAGITIS: Primary | ICD-10-CM

## 2024-08-05 DIAGNOSIS — K22.2 PEPTIC STRICTURE OF ESOPHAGUS: ICD-10-CM

## 2024-08-05 NOTE — TELEPHONE ENCOUNTER
Scheduled date of EGD(as of today): 10/4/24  Physician performing EGD: bar  [facility] of EGD: [facility]  Instructions reviewed with patient by: office  Clearances: none

## 2024-08-05 NOTE — TELEPHONE ENCOUNTER
----- Message from Jeffrey Leija DO sent at 8/2/2024  1:08 PM EDT -----  Regarding: Repeat EGD  Please schedule repeat upper endoscopy at [facility] with me in 8 weeks or so due to erosive esophagitis and peptic stricture, thanks!

## 2024-08-08 PROCEDURE — 88305 TISSUE EXAM BY PATHOLOGIST: CPT | Performed by: PATHOLOGY

## 2024-08-13 ENCOUNTER — TELEPHONE (OUTPATIENT)
Dept: GASTROENTEROLOGY | Facility: CLINIC | Age: 29
End: 2024-08-13

## 2024-08-13 DIAGNOSIS — K21.9 GASTROESOPHAGEAL REFLUX DISEASE WITHOUT ESOPHAGITIS: Primary | ICD-10-CM

## 2024-08-13 RX ORDER — SUCRALFATE 1 G/1
1 TABLET ORAL
Qty: 60 TABLET | Refills: 0 | Status: SHIPPED | OUTPATIENT
Start: 2024-08-13

## 2024-08-13 NOTE — TELEPHONE ENCOUNTER
Left for patient at the phone number available on the EMR.  Acknowledge to the patient that a prescription for Carafate will be sent to her pharmacy.  The Carafate can be taken twice a day prior to meals.  Suggested possibly breakfast and dinner however she will have enough medication at this time if she needs like a third dose into the evening.  She does have an appointment with DAVE Rich on 8/21.  Encouraged to send [employer] message or contact the office with any questions or concerns regarding this information.

## 2024-08-21 ENCOUNTER — OFFICE VISIT (OUTPATIENT)
Dept: GASTROENTEROLOGY | Facility: CLINIC | Age: 29
End: 2024-08-21
Payer: COMMERCIAL

## 2024-08-21 VITALS
HEIGHT: 61 IN | DIASTOLIC BLOOD PRESSURE: 80 MMHG | WEIGHT: 260 LBS | BODY MASS INDEX: 49.09 KG/M2 | SYSTOLIC BLOOD PRESSURE: 120 MMHG

## 2024-08-21 DIAGNOSIS — K21.00 GASTROESOPHAGEAL REFLUX DISEASE WITH ESOPHAGITIS WITHOUT HEMORRHAGE: ICD-10-CM

## 2024-08-21 DIAGNOSIS — K22.10 EROSIVE ESOPHAGITIS: Primary | ICD-10-CM

## 2024-08-21 PROCEDURE — 99213 OFFICE O/P EST LOW 20 MIN: CPT | Performed by: NURSE PRACTITIONER

## 2024-08-21 NOTE — PROGRESS NOTES
[facility] - Outpatient Follow-up Note  Marisa Fletcher 29 y.o. female MRN: [id number]  Encounter: [id number]    ASSESSMENT AND PLAN:    1.  GERD  2.  Grade B erosive esophagitis  3.  3 cm hiatal hernia  EGD 8/2/2024 showed grade B esophagitis, benign appearing traversable stricture in the esophagus which was not dilated due to active esophagitis, 3 cm hiatal hernia.  Esophageal biopsy was negative for intestinal metaplasia  Reflux symptoms improved with omeprazole 40 mg daily but not resolved  Prescribed sucralfate 1 g twice daily as needed which does help persistent epigastric pain/burning  Denies dysphagia, no alarm symptoms  -Scheduled for repeat EGD in October at [facility] due to BMI  -Continue omeprazole 40 mg daily.  Will plan to increase to twice daily if she continues to experience epigastric burning with taking sucralfate twice daily on a regular basis  -Recommend sucralfate 1 g twice daily x 2 weeks  -Reviewed GERD diet and lifestyle modifications, written instructions provided.  Recommend gentle weight loss and increase daily exercise    Followup Appointment: December 2024 after repeat EGD  ______________________________________________________________________    No chief complaint on file.    HPI: Presents in follow-up for history of GERD and prior peptic ulcer.  EGD 8/2/2024 showed grade B esophagitis, benign appearing traversable stricture in the esophagus which was not dilated due to active esophagitis, 3 cm hiatal hernia.  Esophageal biopsy was negative for intestinal metaplasia    Patient is currently taking omeprazole 40 mg daily.  Continue to experience epigastric burning and discomfort intermittently but on a daily basis.  Liquid reflux at night has resolved with PPI  Sucralfate 1 g twice daily was prescribed which patient takes as needed and does resolve epigastric burning  Denies dysphagia, appetite is good and her weight has been  stable    Historical Information   Past Medical History:   Diagnosis Date    ADHD     Not currently on meds-stopped meds at age 21    Asthma     Albuterol inhaler    Complete  2022    Hypertension complications     Inguinal hernia     Maternal iron deficiency anemia affecting pregnancy in third trimester, antepartum     2023 Hgb 8.5  Started twice weekly iron infusions.   Will plan repeat labs 34 weeks.     Miscarriage 22    PCOS (polycystic ovarian syndrome)     Not confirmed-US negative    Preeclampsia in postpartum period     [facility] 1 week pp for preeclampsia with severe features due to Headache and elevated blood pressure.    Discharged home 2023 after received 24 hours magnesium sulfate.  No antihypertensives needed.  She did see neurology for headache but they felt was muskuloskeletal and recom no imaging, gave Flexoril.    Recurrent UTI     asymptomatic    Sleep apnea      Past Surgical History:   Procedure Laterality Date    COLONOSCOPY      16 years, [place]    UPPER GASTROINTESTINAL ENDOSCOPY       Social History     Substance and Sexual Activity   Alcohol Use Not Currently     Social History     Substance and Sexual Activity   Drug Use Never     Social History     Tobacco Use   Smoking Status Never    Passive exposure: Never   Smokeless Tobacco Never   Tobacco Comments         Family History   Problem Relation Age of Onset    Allergic rhinitis Mother     Heart attack Mother     Ulcerative colitis Mother     Irritable bowel syndrome Mother     Jaundice Mother     Asthma Father     Allergic rhinitis Father     Hypertension Father     Diabetes Father     Diverticulitis Father     Irritable bowel syndrome Father     Asthma Sister     Allergic rhinitis Sister     Colon cancer Neg Hx          Current Outpatient Medications:     albuterol (PROVENTIL HFA,VENTOLIN HFA) 90 mcg/act inhaler    EPINEPHrine (EPIPEN) 0.3 mg/0.3 mL SOAJ    omeprazole (PriLOSEC) 40 MG  "capsule    sucralfate (CARAFATE) 1 g tablet    acetaminophen (TYLENOL) 325 mg tablet    cetirizine (ZyrTEC) 10 mg tablet    Fluticasone-Salmeterol (Advair Diskus) 100-50 mcg/dose inhaler    norgestimate-ethinyl estradiol (Sprintec 28) 0.25-35 MG-MCG per tablet    Prenatal Vit-Fe Fumarate-FA (PRENATAL VITAMINS PO)    Spacer/Aero-Holding Chambers (Vortex Valved Holding Chamber) AYE  Allergies   Allergen Reactions    Coconut Oil - Food Allergy     Iodinated Contrast Media Other (See Comments)    Shellfish-Derived Products - Food Allergy     Treenut [Nuts - Food Allergy]     Silvadene [Silver Sulfadiazine] Hives     Reviewed medications and allergies and updated as indicated    PHYSICAL EXAM:    Blood pressure 120/80, height 5' 1\" (1.549 m), weight 118 kg (260 lb), currently breastfeeding. Body mass index is 49.13 kg/m².  General Appearance: NAD, cooperative, alert  Eyes: Anicteric  ENT:  Normocephalic, atraumatic, normal mucosa.    Neck:  Supple, symmetrical, trachea midline  Resp:  Clear to auscultation bilaterally; no rales, rhonchi or wheezing; respirations unlabored   CV:  S1 S2, Regular rate and rhythm; no murmur, rub, or gallop.  GI:  Soft, non-tender, non-distended; normal bowel sounds; no masses, no organomegaly   Rectal: Deferred  Musculoskeletal: No cyanosis, clubbing or edema. Normal ROM.  Skin:  No jaundice, rashes, or lesions   Psych: Normal affect, good eye contact  Neuro: No gross deficits, AAOx3    Lab Results:   Lab Results   Component Value Date    WBC 7.07 08/26/2023    HGB 11.3 (L) 08/26/2023    HCT 35.4 08/26/2023    MCV 94 08/26/2023     08/26/2023     Lab Results   Component Value Date    K 3.5 04/12/2024     04/12/2024    CO2 25 04/12/2024    BUN 7 04/12/2024    CREATININE 0.65 04/12/2024    GLUCOSE 180 (H) 06/10/2023    GLUCOSE 151 06/10/2023    GLUF 85 06/10/2023    CALCIUM 9.2 04/12/2024    CORRECTEDCA 7.9 (L) 08/25/2023    AST 18 04/12/2024    ALT 24 04/12/2024    ALKPHOS 86 " 04/12/2024    EGFR 122 04/12/2024     Lab Results   Component Value Date    IRON 106 08/03/2023    FERRITIN 52 08/03/2023     Lab Results   Component Value Date    LIPASE 11 08/24/2023       Radiology Results:   EGD    Result Date: 8/2/2024  Narrative: Table formatting from the original result was not included. [facility] [address] [facility] [address] [phone] DATE OF SERVICE: 8/02/24 PHYSICIAN(S): Attending: Jeffrey Leija DO Fellow: No Staff Documented INDICATION: Gastroesophageal reflux disease without esophagitis, Personal history of gastric ulcer POST-OP DIAGNOSIS: See the impression below. PREPROCEDURE: Informed consent was obtained for the procedure, including sedation.  Risks of perforation, hemorrhage, adverse drug reaction and aspiration were discussed. The patient was placed in the left lateral decubitus position. Patient was explained about the risks and benefits of the procedure. Risks including but not limited to bleeding, infection, and perforation were explained in detail. Also explained about less than 100% sensitivity with the exam and other alternatives. PROCEDURE: EGD DETAILS OF PROCEDURE: Patient was taken to the procedure room where a time out was performed to confirm correct patient and correct procedure. The patient underwent monitored anesthesia care, which was administered by an anesthesia professional. The patient's blood pressure, ECG and oxygen were monitored throughout the procedure. The scope was introduced through the mouth and advanced to the second part of the duodenum. Retroflexion was performed in the fundus and incisura. Prior to the procedure, the patient's H. Pylori status was unknown. The patient experienced no blood loss. The procedure was not difficult. The patient tolerated the procedure well. There were no apparent adverse events. ANESTHESIA INFORMATION: ASA: III Anesthesia Type: IV Sedation with Anesthesia  MEDICATIONS: No administrations occurring from 1256 to 1307 on 08/02/24 FINDINGS: Grade B esophagitis with mucosal breaks measuring 5 mm or more not continuous between folds, covering less than 75% of the circumference Benign-appearing and peptic stricture (traversable) in the esophagus. Not dilated due to active esophagitis 3 cm hiatal hernia:  Hill classification: Grade III The stomach appeared normal. Performed random biopsy using biopsy forceps to rule out H. pylori. SPECIMENS: ID Type Source Tests Collected by Time Destination 1 : biopsy r/o H.pylori Tissue Esophagus TISSUE EXAM Jeffrey Leija DO 8/2/2024  1:07 PM      Impression: Grade B esophagitis Peptic stricture in the esophagus 3 cm hiatal hernia The stomach appeared normal. Performed random biopsy to rule out H. pylori. RECOMMENDATION: Await pathology results Start omeprazole    Jeffrey Leija DO

## 2024-09-14 ENCOUNTER — OFFICE VISIT (OUTPATIENT)
Dept: URGENT CARE | Facility: CLINIC | Age: 29
End: 2024-09-14
Payer: COMMERCIAL

## 2024-09-14 VITALS
OXYGEN SATURATION: 99 % | HEART RATE: 90 BPM | RESPIRATION RATE: 18 BRPM | SYSTOLIC BLOOD PRESSURE: 135 MMHG | DIASTOLIC BLOOD PRESSURE: 85 MMHG | TEMPERATURE: 98 F

## 2024-09-14 DIAGNOSIS — L60.0 INGROWN NAIL OF GREAT TOE OF RIGHT FOOT: ICD-10-CM

## 2024-09-14 DIAGNOSIS — L60.0 INGROWN NAIL OF GREAT TOE OF LEFT FOOT: Primary | ICD-10-CM

## 2024-09-14 PROCEDURE — 99213 OFFICE O/P EST LOW 20 MIN: CPT | Performed by: PHYSICIAN ASSISTANT

## 2024-09-14 NOTE — PATIENT INSTRUCTIONS
Patient was educated to continue antibiotics as prescribed.  Patient was educated on soaking bilateral great toes.  Follow-up with podiatry as soon as possible

## 2024-09-14 NOTE — PROGRESS NOTES
[facility]        NAME: Marisa Fletcher is a 29 y.o. female  : 1995    MRN: [id number]  DATE: 2024  TIME: 6:25 PM    Assessment and Plan   Ingrown nail of great toe of left foot [L60.0]  1. Ingrown nail of great toe of left foot  Ambulatory Referral to Podiatry      2. Ingrown nail of great toe of right foot  Ambulatory Referral to Podiatry            Patient Instructions   Patient was educated to continue antibiotics as prescribed.  Patient was educated on soaking bilateral great toes.  Follow-up with podiatry as soon as possible    Follow up with PCP in 3-5 days.  Proceed to  ER if symptoms worsen.    If tests have been performed at [facility], our office will contact you with results if changes need to be made to the care plan discussed with you at the visit.  You can review your full results on [facility].    Chief Complaint     Chief Complaint   Patient presents with    Possible Ingrown Toenail of Left Foot     Pt reports that she has been trying to treat possible ingrown toenail x2 weeks. Pt had a telehealth visit and was prescribed amoxicillin and was informed to be seen in person for assessment of toenail.          History of Present Illness       Patient is a pleasant 29-year-old female who presents today complaining of left great toe pain.  Patient reports she has been suffering with a left great toe ingrown nail for 2 weeks.  Patient reports she did see a telehealth doctor 1 week ago and was prescribed antibiotics.  Patient reports she did not start antibiotics until 3 days ago.  Patient reports left great toe has pain and mild discharge.  Patient has been soaking bilateral great toes.        Review of Systems   Review of Systems   Constitutional: Negative.    Respiratory: Negative.     Cardiovascular: Negative.    Musculoskeletal:         Left and right great toe pain   Psychiatric/Behavioral: Negative.           Current Medications       Current  Outpatient Medications:     amoxicillin-clavulanate (AUGMENTIN) 875-125 mg per tablet, Take 1 tablet by mouth every 12 (twelve) hours, Disp: , Rfl:     EPINEPHrine (EPIPEN) 0.3 mg/0.3 mL SOAJ, Inject 0.3 mL (0.3 mg total) into a muscle once for 1 dose, Disp: 6 each, Rfl: 3    omeprazole (PriLOSEC) 40 MG capsule, TAKE 1 CAPSULE (40 MG TOTAL) BY MOUTH DAILY., Disp: 100 capsule, Rfl: 1    sucralfate (CARAFATE) 1 g tablet, Take 1 tablet (1 g total) by mouth 2 (two) times a day before meals, Disp: 60 tablet, Rfl: 0    acetaminophen (TYLENOL) 325 mg tablet, Take 2 tablets (650 mg total) by mouth every 4 (four) hours as needed for mild pain (Patient not taking: Reported on 8/21/2024), Disp: , Rfl: 0    albuterol (PROVENTIL HFA,VENTOLIN HFA) 90 mcg/act inhaler, Inhale 2 puffs every 4 (four) hours as needed for wheezing, Disp: 18 g, Rfl: 1    cetirizine (ZyrTEC) 10 mg tablet, TAKE 1 TABLET BY MOUTH EVERY DAY (Patient not taking: Reported on 8/21/2024), Disp: 90 tablet, Rfl: 2    Fluticasone-Salmeterol (Advair Diskus) 100-50 mcg/dose inhaler, Inhale 1 puff 2 (two) times a day Rinse mouth after use. (Patient not taking: Reported on 9/14/2024), Disp: 60 blister, Rfl: 0    norgestimate-ethinyl estradiol (Sprintec 28) 0.25-35 MG-MCG per tablet, Take 1 tablet by mouth daily (Patient not taking: Reported on 6/24/2024), Disp: 84 tablet, Rfl: 4    Prenatal Vit-Fe Fumarate-FA (PRENATAL VITAMINS PO), Take by mouth (Patient not taking: Reported on 11/2/2023), Disp: , Rfl:     Spacer/Aero-Holding Chambers (Vortex Valved Holding Chamber) AYE, Use 2 (two) times a day, Disp: 1 each, Rfl: 0    Current Allergies     Allergies as of 09/14/2024 - Reviewed 09/14/2024   Allergen Reaction Noted    Coconut oil - food allergy  05/17/2019    Iodinated contrast media Other (See Comments) 12/29/2022    Shellfish-derived products - food allergy  05/17/2019    Treenut [nuts - food allergy]  05/17/2019    Silvadene [silver sulfadiazine] Hives 09/17/2021             The following portions of the patient's history were reviewed and updated as appropriate: allergies, current medications, past family history, past medical history, past social history, past surgical history and problem list.     Past Medical History:   Diagnosis Date    ADHD     Not currently on meds-stopped meds at age 21    Asthma     Albuterol inhaler    Complete  2022    Hypertension complications     Inguinal hernia     Maternal iron deficiency anemia affecting pregnancy in third trimester, antepartum     2023 Hgb 8.5  Started twice weekly iron infusions.   Will plan repeat labs 34 weeks.     Miscarriage 22    PCOS (polycystic ovarian syndrome)     Not confirmed-US negative    Preeclampsia in postpartum period     Admitted to [facility] 1 week pp for preeclampsia with severe features due to Headache and elevated blood pressure.    Discharged home 2023 after received 24 hours magnesium sulfate.  No antihypertensives needed.  She did see neurology for headache but they felt was muskuloskeletal and recom no imaging, gave Flexoril.    Recurrent UTI     asymptomatic    Sleep apnea        Past Surgical History:   Procedure Laterality Date    COLONOSCOPY      16 years, [place]    UPPER GASTROINTESTINAL ENDOSCOPY         Family History   Problem Relation Age of Onset    Allergic rhinitis Mother     Heart attack Mother     Ulcerative colitis Mother     Irritable bowel syndrome Mother     Jaundice Mother     Asthma Father     Allergic rhinitis Father     Hypertension Father     Diabetes Father     Diverticulitis Father     Irritable bowel syndrome Father     Asthma Sister     Allergic rhinitis Sister     Colon cancer Neg Hx          Medications have been verified.        Objective   /85   Pulse 90   Temp 98 °F (36.7 °C)   Resp 18   LMP 08/15/2024 (Approximate)   SpO2 99%   Breastfeeding No   Patient's last menstrual period was 08/15/2024 (approximate).        Physical Exam     Physical Exam  Vitals and nursing note reviewed.   Constitutional:       Appearance: Normal appearance.   HENT:      Head: Normocephalic.   Cardiovascular:      Rate and Rhythm: Normal rate and regular rhythm.      Heart sounds: Normal heart sounds.   Pulmonary:      Breath sounds: Normal breath sounds. No wheezing.   Musculoskeletal:      Comments: Left great toe is red with mild discharge.    Right great toe is mildly red.   Neurological:      General: No focal deficit present.      Mental Status: She is alert and oriented to person, place, and time.   Psychiatric:         Mood and Affect: Mood normal.         Behavior: Behavior normal.

## 2024-10-04 ENCOUNTER — HOSPITAL ENCOUNTER (OUTPATIENT)
Dept: GASTROENTEROLOGY | Facility: HOSPITAL | Age: 29
Setting detail: OUTPATIENT SURGERY
End: 2024-10-04
Attending: INTERNAL MEDICINE
Payer: COMMERCIAL

## 2024-10-04 ENCOUNTER — ANESTHESIA (OUTPATIENT)
Dept: GASTROENTEROLOGY | Facility: HOSPITAL | Age: 29
End: 2024-10-04
Payer: COMMERCIAL

## 2024-10-04 ENCOUNTER — ANESTHESIA EVENT (OUTPATIENT)
Dept: GASTROENTEROLOGY | Facility: HOSPITAL | Age: 29
End: 2024-10-04
Payer: COMMERCIAL

## 2024-10-04 VITALS
OXYGEN SATURATION: 98 % | DIASTOLIC BLOOD PRESSURE: 55 MMHG | TEMPERATURE: 97.6 F | SYSTOLIC BLOOD PRESSURE: 105 MMHG | RESPIRATION RATE: 18 BRPM | HEART RATE: 71 BPM

## 2024-10-04 DIAGNOSIS — K22.2 PEPTIC STRICTURE OF ESOPHAGUS: ICD-10-CM

## 2024-10-04 DIAGNOSIS — K22.10 EROSIVE ESOPHAGITIS: ICD-10-CM

## 2024-10-04 LAB
EXT PREGNANCY TEST URINE: NEGATIVE
EXT. CONTROL: ABNORMAL

## 2024-10-04 PROCEDURE — 43235 EGD DIAGNOSTIC BRUSH WASH: CPT | Performed by: INTERNAL MEDICINE

## 2024-10-04 PROCEDURE — 81025 URINE PREGNANCY TEST: CPT | Performed by: INTERNAL MEDICINE

## 2024-10-04 RX ORDER — FENTANYL CITRATE 50 UG/ML
INJECTION, SOLUTION INTRAMUSCULAR; INTRAVENOUS AS NEEDED
Status: DISCONTINUED | OUTPATIENT
Start: 2024-10-04 | End: 2024-10-04

## 2024-10-04 RX ORDER — LIDOCAINE HYDROCHLORIDE 10 MG/ML
INJECTION, SOLUTION EPIDURAL; INFILTRATION; INTRACAUDAL; PERINEURAL AS NEEDED
Status: DISCONTINUED | OUTPATIENT
Start: 2024-10-04 | End: 2024-10-04

## 2024-10-04 RX ORDER — SODIUM CHLORIDE 9 MG/ML
INJECTION, SOLUTION INTRAVENOUS CONTINUOUS PRN
Status: DISCONTINUED | OUTPATIENT
Start: 2024-10-04 | End: 2024-10-04

## 2024-10-04 RX ORDER — PROPOFOL 10 MG/ML
INJECTION, EMULSION INTRAVENOUS AS NEEDED
Status: DISCONTINUED | OUTPATIENT
Start: 2024-10-04 | End: 2024-10-04

## 2024-10-04 RX ADMIN — FENTANYL CITRATE 25 MCG: 50 INJECTION, SOLUTION INTRAMUSCULAR; INTRAVENOUS at 13:11

## 2024-10-04 RX ADMIN — SODIUM CHLORIDE: 0.9 INJECTION, SOLUTION INTRAVENOUS at 13:08

## 2024-10-04 RX ADMIN — PROPOFOL 160 MG: 10 INJECTION, EMULSION INTRAVENOUS at 13:15

## 2024-10-04 RX ADMIN — LIDOCAINE HYDROCHLORIDE 100 MG: 10 INJECTION, SOLUTION EPIDURAL; INFILTRATION; INTRACAUDAL; PERINEURAL at 13:13

## 2024-10-04 NOTE — ANESTHESIA POSTPROCEDURE EVALUATION
Post-Op Assessment Note    CV Status:  Stable    Pain management: adequate       Mental Status:  Lethargic and sleepy   Hydration Status:  Stable   PONV Controlled:  None   Airway Patency:  Patent     Post Op Vitals Reviewed: Yes    No anethesia notable event occurred.    Staff: Anesthesiologist, CRNA               BP   117/60   Temp      Pulse  88   Resp      SpO2   94

## 2024-10-04 NOTE — ANESTHESIA PREPROCEDURE EVALUATION
Procedure:  EGD    Relevant Problems   CARDIO   (+) Hyperlipidemia   (+) Menstrual migraine without status migrainosus, not intractable      GI/HEPATIC   (+) Erosive esophagitis   (+) Gastric ulcer   (+) Gastroesophageal reflux disease with esophagitis      HEMATOLOGY   (+) Iron deficiency anemia      MUSCULOSKELETAL   (+) Levoscoliosis of lumbar spine   (+) Pelvic floor weakness      NEURO/PSYCH   (+) Acute post-traumatic headache, not intractable   (+) Generalized anxiety disorder   (+) Menstrual migraine without status migrainosus, not intractable   (+) Tension type headache      PULMONARY   (+) Asthma during pregnancy   (+) NATTY (obstructive sleep apnea)   (+) Obstructive sleep apnea   (+) Obstructive sleep apnea of adult      Other   (+) Class 3 severe obesity without serious comorbidity with body mass index (BMI) of 45.0 to 49.9 in adult (HCC)        Physical Exam    Airway    Mallampati score: II  TM Distance: >3 FB  Neck ROM: full     Dental   No notable dental hx     Cardiovascular      Pulmonary      Other Findings  post-pubertal.      Anesthesia Plan  ASA Score- 3     Anesthesia Type- IV sedation with anesthesia with ASA Monitors.         Additional Monitors:     Airway Plan:            Plan Factors-    Chart reviewed.    Patient summary reviewed.    Patient is not a current smoker.              Induction- intravenous.    Postoperative Plan-         Informed Consent- Anesthetic plan and risks discussed with patient.  I personally reviewed this patient with the CRNA. Discussed and agreed on the Anesthesia Plan with the CRNA..

## 2024-10-04 NOTE — H&P
History and Physical -  [facility]  Marisa Fletcher 29 y.o. female MRN: [id number]    HPI: Marisa Fletcher is a 29 y.o. female who presents for repeat endoscopy and possible dilation due to LA grade B erosive esophagitis and stricture seen on last endoscopy    REVIEW OF SYSTEMS: Per the HPI, and otherwise unremarkable.    Historical Information   Past Medical History:   Diagnosis Date    ADHD     Not currently on meds-stopped meds at age 21    Asthma     Albuterol inhaler    Complete  2022    Hypertension complications     Inguinal hernia     Maternal iron deficiency anemia affecting pregnancy in third trimester, antepartum     2023 Hgb 8.5  Started twice weekly iron infusions.   Will plan repeat labs 34 weeks.     Miscarriage 22    PCOS (polycystic ovarian syndrome)     Not confirmed-US negative    Preeclampsia in postpartum period     Admitted to  [facility] 1 week pp for preeclampsia with severe features due to Headache and elevated blood pressure.    Discharged home 2023 after received 24 hours magnesium sulfate.  No antihypertensives needed.  She did see neurology for headache but they felt was muskuloskeletal and recom no imaging, gave Flexoril.    Recurrent UTI     asymptomatic    Sleep apnea      Past Surgical History:   Procedure Laterality Date    COLONOSCOPY      16 years, [place]    UPPER GASTROINTESTINAL ENDOSCOPY       Social History   Social History     Substance and Sexual Activity   Alcohol Use Not Currently     Social History     Substance and Sexual Activity   Drug Use Never     Social History     Tobacco Use   Smoking Status Never    Passive exposure: Never   Smokeless Tobacco Never   Tobacco Comments         Family History   Problem Relation Age of Onset    Allergic rhinitis Mother     Heart attack Mother     Ulcerative colitis Mother     Irritable bowel syndrome Mother     Jaundice Mother     Asthma Father      Allergic rhinitis Father     Hypertension Father     Diabetes Father     Diverticulitis Father     Irritable bowel syndrome Father     Asthma Sister     Allergic rhinitis Sister     Colon cancer Neg Hx        Meds/Allergies       Current Outpatient Medications:     omeprazole (PriLOSEC) 40 MG capsule    sucralfate (CARAFATE) 1 g tablet    acetaminophen (TYLENOL) 325 mg tablet    albuterol (PROVENTIL HFA,VENTOLIN HFA) 90 mcg/act inhaler    cetirizine (ZyrTEC) 10 mg tablet    EPINEPHrine (EPIPEN) 0.3 mg/0.3 mL SOAJ    Fluticasone-Salmeterol (Advair Diskus) 100-50 mcg/dose inhaler    norgestimate-ethinyl estradiol (Sprintec 28) 0.25-35 MG-MCG per tablet    Prenatal Vit-Fe Fumarate-FA (PRENATAL VITAMINS PO)    Spacer/Aero-Holding Chambers (Vortex Valved Holding Chamber) AYE    Allergies   Allergen Reactions    Coconut Oil - Food Allergy     Iodinated Contrast Media Other (See Comments)    Shellfish-Derived Products - Food Allergy     Treenut [Nuts - Food Allergy]     Silvadene [Silver Sulfadiazine] Hives       Objective     LMP 08/15/2024 (Approximate)     PHYSICAL EXAM    General Appearance: NAD, cooperative, alert  Eyes: Anicteric  GI:  Soft, non-tender, non-distended; normal bowel sounds; no masses, no organomegaly   Rectal: Deferred until procedure  Musculoskeletal: No edema.  Skin:  No jaundice    ASSESSMENT/PLAN:  This is a 29 y.o. female here for upper endoscopy and possible dilation, and she is stable and optimized for her procedure.

## 2024-12-12 ENCOUNTER — OFFICE VISIT (OUTPATIENT)
Dept: FAMILY MEDICINE CLINIC | Facility: HOSPITAL | Age: 29
End: 2024-12-12
Payer: COMMERCIAL

## 2024-12-12 VITALS
TEMPERATURE: 98.6 F | DIASTOLIC BLOOD PRESSURE: 82 MMHG | HEART RATE: 92 BPM | SYSTOLIC BLOOD PRESSURE: 116 MMHG | BODY MASS INDEX: 48.82 KG/M2 | OXYGEN SATURATION: 96 % | HEIGHT: 61 IN | WEIGHT: 258.6 LBS

## 2024-12-12 DIAGNOSIS — E66.813 CLASS 3 SEVERE OBESITY WITHOUT SERIOUS COMORBIDITY WITH BODY MASS INDEX (BMI) OF 45.0 TO 49.9 IN ADULT, UNSPECIFIED OBESITY TYPE (HCC): ICD-10-CM

## 2024-12-12 DIAGNOSIS — F32.2 SEVERE MAJOR DEPRESSIVE DISORDER (HCC): ICD-10-CM

## 2024-12-12 DIAGNOSIS — Z23 ENCOUNTER FOR IMMUNIZATION: ICD-10-CM

## 2024-12-12 DIAGNOSIS — J45.40 MODERATE PERSISTENT ASTHMA WITHOUT COMPLICATION: ICD-10-CM

## 2024-12-12 DIAGNOSIS — E66.01 CLASS 3 SEVERE OBESITY WITHOUT SERIOUS COMORBIDITY WITH BODY MASS INDEX (BMI) OF 45.0 TO 49.9 IN ADULT, UNSPECIFIED OBESITY TYPE (HCC): ICD-10-CM

## 2024-12-12 DIAGNOSIS — R07.89 CHEST PRESSURE: ICD-10-CM

## 2024-12-12 DIAGNOSIS — R30.0 DYSURIA: ICD-10-CM

## 2024-12-12 DIAGNOSIS — Z13.6 SCREENING FOR CARDIOVASCULAR CONDITION: ICD-10-CM

## 2024-12-12 DIAGNOSIS — Z11.1 TUBERCULOSIS SCREENING: ICD-10-CM

## 2024-12-12 DIAGNOSIS — M54.50 ACUTE RIGHT-SIDED LOW BACK PAIN WITHOUT SCIATICA: ICD-10-CM

## 2024-12-12 DIAGNOSIS — Z00.00 ANNUAL PHYSICAL EXAM: Primary | ICD-10-CM

## 2024-12-12 PROCEDURE — 99395 PREV VISIT EST AGE 18-39: CPT

## 2024-12-12 PROCEDURE — 90656 IIV3 VACC NO PRSV 0.5 ML IM: CPT

## 2024-12-12 PROCEDURE — 99213 OFFICE O/P EST LOW 20 MIN: CPT

## 2024-12-12 PROCEDURE — 90471 IMMUNIZATION ADMIN: CPT

## 2024-12-12 NOTE — ASSESSMENT & PLAN NOTE
-Already being addressed by urgent care, awaiting urine culture results and possible antibiotic prescription  - Will continue to monitor

## 2024-12-12 NOTE — PROGRESS NOTES
Adult Annual Physical  Name: Marisa Fletcher      : 1995      MRN: [id number]  Encounter Provider: Mary Martinez DO  Encounter Date: 2024   Encounter department: [facility] [address]    Assessment & Plan  Annual physical exam         Acute right-sided low back pain without sciatica  -Suspect MSK pain as it is reproducible, CVA tenderness negative  -Recommend patient continue with Tylenol  -Offered patient imaging, declines  - Will continue to monitor         Dysuria  -Already being addressed by urgent care, awaiting urine culture results and possible antibiotic prescription  - Will continue to monitor         Severe major depressive disorder (HCC)  Depression Screening Follow-up Plan: Patient's depression screening was positive with a PHQ-9 score of 5.   -Mood stable  -Not currently on medication       Moderate persistent asthma without complication  -Stable  -Continue albuterol, Advair       Tuberculosis screening  QuantiFERON last performed 10/2022, required every 2 years by employer, so patient is due  Will contact patient for her to obtain blood work, follow-up results and complete form at that time  Orders:    Quantiferon TB Gold Plus Assay; Future    Encounter for immunization    Orders:    influenza vaccine preservative-free 0.5 mL IM (Fluzone, Afluria, Fluarix, Flulaval)    Screening for cardiovascular condition    Orders:    Hemoglobin A1C; Future    Lipid panel; Future    Class 3 severe obesity without serious comorbidity with body mass index (BMI) of 45.0 to 49.9 in adult, unspecified obesity type (HCC)      Orders:    Hemoglobin A1C; Future    Lipid panel; Future    Chest pressure  Suspect secondary to costochondritis as well as superficial bruising  Nontender on physical exam  Continue with Tylenol prescription       Immunizations and preventive care screenings were discussed with patient today. Appropriate education was printed on patient's  after visit summary.    Counseling:  Dental Health: discussed importance of regular tooth brushing, flossing, and dental visits.    BMI Counseling: Body mass index is 48.86 kg/m². The BMI is above normal. Exercise recommendations include exercising 3-5 times per week.         History of Present Illness     Adult Annual Physical:  Patient presents for annual physical. Patient is agreeable to further discussion outside topics of annual physical.    LBP b/l  - Since Sunday  - Burning dull, ache. Stabbing when laying down  - Constant dull ache  - Doesn't radiate  Denies any prior back injuries  Dysuria after urinating, +urgency  Monday took home tests: -HCG, +leukocytes in urine. Went to  ([facility] +blood but also spotting from her periods, awaiting UCX results  H/o kidney infx in past  Denies fever  No abx allergies in the past  Taking cranberry supplement for symptoms    Chest Pressure - not at same time as back pain. More chest tenderness, also started on Sunday. Has a bruise as well d/t children dancing on top of her.     Diet and Physical Activity:  - Diet/Nutrition: well balanced diet and poor diet.  - Exercise: 1-2 times a week on average. 6000 steps a day [place] once a week    Depression Screening:    - PHQ-9 Score: 5    General Health:  - Sleep: 7-8 hours of sleep on average.  - Hearing: normal hearing bilateral ears.  - Vision: no vision problems.  - Dental: no dental visits for > 1 year.    /GYN Health:  - Follows with GYN: yes.   - Menopause: premenopausal.   - Last menstrual cycle: 12/11/2024.     Preventative Screening  Breast: Last mammogram denies  Last pap 2022 negative, follows with GYN  FDLMP 7/2024, history of PCOS  Contraception: denies  STD screening declines  Colon: Last colonoscopy age 16, told to go back to regular testing  Family history of breast cancer on mom's side - reports in aunt on both sides, maternal aunt was diagnosed at 48. Denies fa h/o colon cancer, prostate cancer  Labs: Hgb  [id number], FLP ordered    Immunizations  Last TDAP   Last flu ordered for today    BMI Counseling: Body mass index is 48.86 kg/m². The BMI is above normal. Nutrition recommendations include consuming healthier snacks. Exercise recommendations include moderate aerobic physical activity for 150 minutes/week.    Wt Readings from Last 3 Encounters:   24 117 kg (258 lb 9.6 oz)   24 118 kg (260 lb)   24 117 kg (258 lb)       PHQ-2/9 Depression Screening    Little interest or pleasure in doing things: 0 - not at all  Feeling down, depressed, or hopeless: 0 - not at all  Trouble falling or staying asleep, or sleeping too much: 3 - nearly every day  Feeling tired or having little energy: 0 - not at all  Poor appetite or overeatin - not at all  Feeling bad about yourself - or that you are a failure or have let yourself or your family down: 0 - not at all  Trouble concentrating on things, such as reading the newspaper or watching television: 2 - more than half the days  Moving or speaking so slowly that other people could have noticed. Or the opposite - being so fidgety or restless that you have been moving around a lot more than usual: 0 - not at all  Thoughts that you would be better off dead, or of hurting yourself in some way: 0 - not at all  PHQ-9 Score: 5  PHQ-9 Interpretation: Mild depression         Depression Screening Follow-up Plan: Patient's depression screening was positive with a PHQ-2 score of . Their PHQ-9 score was 5. Patient assessed for underlying major depression. They have no active suicidal ideations. Brief counseling provided and recommend additional follow-up/re-evaluation next office visit.  - H/o ADHD  - Not on any medication, not following with a therapist  - Not sleeping to check on child      Family History  Reviewed    Allergies  Documented  Seasonal    Social History  Tobacco - denies history  Alcohol - 1x/mo  Illicit Drugs - denies        Review of Systems    Constitutional:  Negative for chills and fever.   Cardiovascular:  Positive for chest pain.   Genitourinary:  Positive for dysuria, menstrual problem and urgency. Negative for hematuria.   Musculoskeletal:  Positive for back pain.     Current Outpatient Medications on File Prior to Visit   Medication Sig Dispense Refill    albuterol (PROVENTIL HFA,VENTOLIN HFA) 90 mcg/act inhaler Inhale 2 puffs every 4 (four) hours as needed for wheezing 18 g 1    EPINEPHrine (EPIPEN) 0.3 mg/0.3 mL SOAJ Inject 0.3 mL (0.3 mg total) into a muscle once for 1 dose 6 each 3    omeprazole (PriLOSEC) 40 MG capsule TAKE 1 CAPSULE (40 MG TOTAL) BY MOUTH DAILY. 100 capsule 1    acetaminophen (TYLENOL) 325 mg tablet Take 2 tablets (650 mg total) by mouth every 4 (four) hours as needed for mild pain (Patient not taking: Reported on 8/21/2024)  0    cetirizine (ZyrTEC) 10 mg tablet TAKE 1 TABLET BY MOUTH EVERY DAY (Patient not taking: Reported on 8/21/2024) 90 tablet 2    Fluticasone-Salmeterol (Advair Diskus) 100-50 mcg/dose inhaler Inhale 1 puff 2 (two) times a day Rinse mouth after use. (Patient not taking: Reported on 12/12/2024) 60 blister 0    Prenatal Vit-Fe Fumarate-FA (PRENATAL VITAMINS PO) Take by mouth (Patient not taking: Reported on 11/2/2023)      Spacer/Aero-Holding Chambers (Vortex Valved Holding Chamber) AYE Use 2 (two) times a day (Patient not taking: Reported on 12/12/2024) 1 each 0    sucralfate (CARAFATE) 1 g tablet Take 1 tablet (1 g total) by mouth 2 (two) times a day before meals (Patient not taking: Reported on 12/12/2024) 60 tablet 0    [DISCONTINUED] norgestimate-ethinyl estradiol (Sprintec 28) 0.25-35 MG-MCG per tablet Take 1 tablet by mouth daily (Patient not taking: Reported on 6/24/2024) 84 tablet 4     No current facility-administered medications on file prior to visit.      Social History     Tobacco Use    Smoking status: Never     Passive exposure: Never    Smokeless tobacco: Never    Tobacco comments:       "  Vaping Use    Vaping status: Never Used   Substance and Sexual Activity    Alcohol use: Not Currently    Drug use: Never    Sexual activity: Yes     Partners: Male     Birth control/protection: Condom     Comment: no new partner in past year       Objective   /82   Pulse 92   Temp 98.6 °F (37 °C) (Tympanic)   Ht 5' 1\" (1.549 m)   Wt 117 kg (258 lb 9.6 oz)   SpO2 96%   BMI 48.86 kg/m²     Physical Exam  Vitals reviewed.   Constitutional:       General: She is not in acute distress.     Appearance: Normal appearance. She is well-developed. She is obese. She is not ill-appearing.   HENT:      Head: Normocephalic and atraumatic.      Right Ear: Tympanic membrane, ear canal and external ear normal. There is no impacted cerumen.      Left Ear: Tympanic membrane, ear canal and external ear normal. There is no impacted cerumen.      Mouth/Throat:      Mouth: Mucous membranes are moist.      Pharynx: No posterior oropharyngeal erythema.   Eyes:      Conjunctiva/sclera: Conjunctivae normal.   Cardiovascular:      Rate and Rhythm: Normal rate and regular rhythm.      Heart sounds: Normal heart sounds.   Pulmonary:      Effort: Pulmonary effort is normal.      Breath sounds: Normal breath sounds.   Chest:      Chest wall: No tenderness.   Abdominal:      General: Bowel sounds are normal.      Palpations: Abdomen is soft.      Tenderness: There is no abdominal tenderness. There is no right CVA tenderness, left CVA tenderness, guarding or rebound.   Musculoskeletal:         General: Tenderness (R low back) present. Normal range of motion.   Skin:     General: Skin is warm and dry.   Neurological:      Mental Status: She is alert.      Gait: Gait normal.   Psychiatric:         Mood and Affect: Mood normal.         Behavior: Behavior normal.         Mary Martinez, DO  Family Medicine    "

## 2024-12-12 NOTE — ASSESSMENT & PLAN NOTE
Depression Screening Follow-up Plan: Patient's depression screening was positive with a PHQ-9 score of 5.   -Mood stable  -Not currently on medication

## 2024-12-16 ENCOUNTER — TELEPHONE (OUTPATIENT)
Age: 29
End: 2024-12-16

## 2024-12-16 NOTE — TELEPHONE ENCOUNTER
Pt had a physical on 12/12/24 and left a form that needed to be completed by her pcp. She called to see if that was completed. I informed her that it was not scanned yet and we will call as soon as its ready. She needs this form asap.    Thanks

## 2024-12-17 DIAGNOSIS — Z11.1 TUBERCULOSIS SCREENING: Primary | ICD-10-CM

## 2024-12-17 NOTE — TELEPHONE ENCOUNTER
Patient called she said she spoke to her employer and only the risk assessment part of the form needs to be filled out because she had a TB test done in the past.  She said the form was faxed today. I did say it has been 2 years since last TB test so it may have to be done for form to be completed.      She asked if we can call her once form is received from employer that was faxed today.    Thank you

## 2024-12-17 NOTE — TELEPHONE ENCOUNTER
Patient returned called from office in regards to TB testing and had questions.  Warm transferred to clinical.

## 2024-12-17 NOTE — TELEPHONE ENCOUNTER
Left detailed message explaining that she needs to go to the lab to have her Quantiferon gold test done for her TB screening.    I explained that we are unable to complete the form until we get the TB screening results.     She did not come back to have us read the results when we placed the PPD in the office.     She now needs to go to the lab to have the test drawn.

## 2024-12-17 NOTE — TELEPHONE ENCOUNTER
Spoke to patient and explained the reason why she still needs to have the testing done before Dr Martinez can complete the form is because her last test was done on 10/22/22.  Dr Estes had signed off on her last form on 11/2/23 because it had only been 1 year since the test was last done.     It has now been just over 2 years since the last TB test was done and based on the information on the form a new test must be done every 2 years.     She will need to complete the blood work before we can finish filling out the form.

## 2024-12-19 NOTE — TELEPHONE ENCOUNTER
Patient called requesting update on risk assessment form. Spoke to office staff was advised patient would be given a call back.

## 2025-02-02 LAB
CHOLEST SERPL-MCNC: 207 MG/DL (ref 100–199)
CHOLEST/HDLC SERPL: 4.1 RATIO (ref 0–4.4)
EST. AVERAGE GLUCOSE BLD GHB EST-MCNC: 111 MG/DL
HBA1C MFR BLD: 5.5 % (ref 4.8–5.6)
HDLC SERPL-MCNC: 51 MG/DL
LDLC SERPL CALC-MCNC: 127 MG/DL (ref 0–99)
SL AMB VLDL CHOLESTEROL CALC: 29 MG/DL (ref 5–40)
TRIGL SERPL-MCNC: 163 MG/DL (ref 0–149)

## 2025-02-03 ENCOUNTER — RESULTS FOLLOW-UP (OUTPATIENT)
Dept: FAMILY MEDICINE CLINIC | Facility: HOSPITAL | Age: 30
End: 2025-02-03

## 2025-02-03 NOTE — RESULT ENCOUNTER NOTE
Patient's cholesterol is elevated, however patient is not diabetic.  Recommend improve diet and exercise habits, repeat in 1 year  Mary Martinez DO  Family Medicine